# Patient Record
Sex: FEMALE | Race: WHITE | NOT HISPANIC OR LATINO | Employment: FULL TIME | URBAN - METROPOLITAN AREA
[De-identification: names, ages, dates, MRNs, and addresses within clinical notes are randomized per-mention and may not be internally consistent; named-entity substitution may affect disease eponyms.]

---

## 2017-02-10 ENCOUNTER — MYC MEDICAL ADVICE (OUTPATIENT)
Dept: FAMILY MEDICINE | Facility: CLINIC | Age: 37
End: 2017-02-10

## 2017-02-10 DIAGNOSIS — J01.90 ACUTE SINUSITIS, UNSPECIFIED: Primary | ICD-10-CM

## 2017-02-10 NOTE — TELEPHONE ENCOUNTER
RN Sinusitis Treatment Protocol: ages 18 and up  Evon Varela, a 36 year old female, is having symptoms reviewed for possible sinus infection.    ASSESSMENT/PLAN:  1.  Antibiotic treatment is indicated as onset of symptoms have been more than 10 days.  Amoxicillin-clavulanate 875mg/125mg orally twice daily or 500mg/125mg three times daily for 5 to 7 days.  2.  Education: ibuprofen or acetaminophen as directed on the bottle, over the counter decongestant, Nasal saline rinse, (Neti-pot or a nasal saline spray is preferred to Afrin nasal spray), Afrin nasal spray no longer than 3 days.  3.  Follow-up: Contact provider's triage RN if symptoms do not improve after 3 days of antibiotic treatment or if symptoms return after antibiotic therapy is complete.   4.  Patient verbalized understanding of this plan and is agreeable.    SUBJECTIVE:  Symptoms: Facial pain or pressure over the sinus areas, especially if worse with position change or cough, Nasal discharge/purulent, Tooth pain, Change in sense of smell or lack of smell and ear pressure, cough, fatigue     In addition notes: None   Shortness of breath: NO  Onset of symptoms was 15 day(s) ago.    Treatment measures tried include OTC meds.   Course of illness is worsening.  Predisposing conditions include: None    Complicating Factors and Serious Symptoms:   Patient reports: NONE   Patient denies: NONE    ALLERGIES:   Allergies   Allergen Reactions     Nkda [No Known Drug Allergies]        OBJECTIVE:    Weight: 0 lbs 0 oz        Encounter handled by: Nurse Triage.     Valeria Cortez RN

## 2017-09-13 DIAGNOSIS — Z30.40 ENCOUNTER FOR SURVEILLANCE OF CONTRACEPTIVES: ICD-10-CM

## 2017-09-15 RX ORDER — DESOGESTREL AND ETHINYL ESTRADIOL AND ETHINYL ESTRADIOL 21-5 (28)
KIT ORAL
Qty: 84 TABLET | Refills: 0 | Status: SHIPPED | OUTPATIENT
Start: 2017-09-15 | End: 2017-12-27

## 2017-09-15 NOTE — TELEPHONE ENCOUNTER
Patient is not due until 11/2017 for her physical.  Sent medication to get her to that appointment.  Marce House RN

## 2017-10-15 ENCOUNTER — OFFICE VISIT (OUTPATIENT)
Dept: URGENT CARE | Facility: URGENT CARE | Age: 37
End: 2017-10-15
Payer: OTHER GOVERNMENT

## 2017-10-15 ENCOUNTER — RADIANT APPOINTMENT (OUTPATIENT)
Dept: GENERAL RADIOLOGY | Facility: CLINIC | Age: 37
End: 2017-10-15
Attending: PHYSICIAN ASSISTANT
Payer: OTHER GOVERNMENT

## 2017-10-15 ENCOUNTER — TRANSFERRED RECORDS (OUTPATIENT)
Dept: HEALTH INFORMATION MANAGEMENT | Facility: CLINIC | Age: 37
End: 2017-10-15

## 2017-10-15 VITALS
HEART RATE: 69 BPM | DIASTOLIC BLOOD PRESSURE: 68 MMHG | TEMPERATURE: 98.2 F | BODY MASS INDEX: 33.41 KG/M2 | OXYGEN SATURATION: 99 % | SYSTOLIC BLOOD PRESSURE: 110 MMHG | WEIGHT: 207 LBS | RESPIRATION RATE: 16 BRPM

## 2017-10-15 DIAGNOSIS — R07.81 RIB PAIN ON LEFT SIDE: Primary | ICD-10-CM

## 2017-10-15 DIAGNOSIS — R10.12 LUQ ABDOMINAL PAIN: ICD-10-CM

## 2017-10-15 DIAGNOSIS — R07.81 RIB PAIN ON LEFT SIDE: ICD-10-CM

## 2017-10-15 LAB
BASOPHILS # BLD AUTO: 0 10E9/L (ref 0–0.2)
BASOPHILS NFR BLD AUTO: 0.3 %
DIFFERENTIAL METHOD BLD: NORMAL
EOSINOPHIL # BLD AUTO: 0.1 10E9/L (ref 0–0.7)
EOSINOPHIL NFR BLD AUTO: 1.4 %
ERYTHROCYTE [DISTWIDTH] IN BLOOD BY AUTOMATED COUNT: 12.8 % (ref 10–15)
HCT VFR BLD AUTO: 41.8 % (ref 35–47)
HGB BLD-MCNC: 13.7 G/DL (ref 11.7–15.7)
LYMPHOCYTES # BLD AUTO: 2.8 10E9/L (ref 0.8–5.3)
LYMPHOCYTES NFR BLD AUTO: 31.1 %
MCH RBC QN AUTO: 30.2 PG (ref 26.5–33)
MCHC RBC AUTO-ENTMCNC: 32.8 G/DL (ref 31.5–36.5)
MCV RBC AUTO: 92 FL (ref 78–100)
MONOCYTES # BLD AUTO: 0.6 10E9/L (ref 0–1.3)
MONOCYTES NFR BLD AUTO: 6.8 %
NEUTROPHILS # BLD AUTO: 5.5 10E9/L (ref 1.6–8.3)
NEUTROPHILS NFR BLD AUTO: 60.4 %
PLATELET # BLD AUTO: 340 10E9/L (ref 150–450)
RBC # BLD AUTO: 4.53 10E12/L (ref 3.8–5.2)
WBC # BLD AUTO: 9.1 10E9/L (ref 4–11)

## 2017-10-15 PROCEDURE — 36415 COLL VENOUS BLD VENIPUNCTURE: CPT | Performed by: PHYSICIAN ASSISTANT

## 2017-10-15 PROCEDURE — 85025 COMPLETE CBC W/AUTO DIFF WBC: CPT | Performed by: PHYSICIAN ASSISTANT

## 2017-10-15 PROCEDURE — 99214 OFFICE O/P EST MOD 30 MIN: CPT | Performed by: PHYSICIAN ASSISTANT

## 2017-10-15 PROCEDURE — 71101 X-RAY EXAM UNILAT RIBS/CHEST: CPT | Mod: LT

## 2017-10-15 RX ORDER — HYDROCODONE BITARTRATE AND ACETAMINOPHEN 5; 325 MG/1; MG/1
1 TABLET ORAL EVERY 6 HOURS PRN
Qty: 15 TABLET | Refills: 0 | Status: SHIPPED | OUTPATIENT
Start: 2017-10-15 | End: 2017-10-30

## 2017-10-15 RX ORDER — CYCLOBENZAPRINE HCL 10 MG
10 TABLET ORAL
Qty: 14 TABLET | Refills: 1 | Status: SHIPPED | OUTPATIENT
Start: 2017-10-15 | End: 2018-01-03

## 2017-10-15 ASSESSMENT — PAIN SCALES - GENERAL: PAINLEVEL: EXTREME PAIN (8)

## 2017-10-15 NOTE — PROGRESS NOTES
SUBJECTIVE:                                                    Evon Varela is a 37 year old female who presents to clinic today for the following health issues:      Patient fell 10/8/17 and landed on her left side, she c/o pain     On a chair in the kitchen cleaning her window. Chair slipped and shee landed onto her left ribs on the lip of the sink. Not so bad the first 2 days. Now with increased pain into LEFT UPPER QUADRANT. Using 4 Advil at a time. Hard to sleep. Hard to take a deep breath.      Allergies   Allergen Reactions     Nkda [No Known Drug Allergies]        Past Medical History:   Diagnosis Date     Seasonal allergies      URI (upper respiratory infection) 1/4/2012         Current Outpatient Prescriptions on File Prior to Visit:  VIORELE 0.15-0.02/0.01 MG (21/5) per tablet TAKE 1 TABLET BY MOUTH EVERY DAY   albuterol (PROAIR HFA, PROVENTIL HFA, VENTOLIN HFA) 108 (90 BASE) MCG/ACT inhaler Inhale 2 puffs into the lungs every 6 hours as needed for shortness of breath / dyspnea or wheezing   sertraline (ZOLOFT) 25 MG tablet Take 1 tablet (25 mg) by mouth daily   albuterol (PROAIR HFA, PROVENTIL HFA, VENTOLIN HFA) 108 (90 BASE) MCG/ACT inhaler Inhale 2 puffs into the lungs every 4 hours as needed for shortness of breath / dyspnea     No current facility-administered medications on file prior to visit.     Social History   Substance Use Topics     Smoking status: Never Smoker     Smokeless tobacco: Never Used     Alcohol use No       ROS:  Consitutional: As above  ENT: As above  Respiratory: As above    OBJECTIVE:  /68  Pulse 69  Temp 98.2  F (36.8  C) (Oral)  Resp 16  Wt 207 lb (93.9 kg)  SpO2 99%  Breastfeeding? No  BMI 33.41 kg/m2  GENERAL APPEARANCE: healthy, alert and no distress  EYES: conjunctiva clear  EARS: No cerumen.   Ear canals w/o erythema, TM's intact w/o erythema.    NOSE/MOUTH: Nose and mouth without ulcers, erythema or lesions  THROAT: Mild erythema w/o tonsillar  enlargement . No exudates  NECK: supple, nontender, no lymphadenopathy  RESP: lungs clear to auscultation - no rales, rhonchi or wheezes  CV: regular rates and rhythm, normal S1 S2, no murmur noted  NEURO: awake, alert    Abdomen- No HSM. Mild- to moderate LEFT UPPER QUADRANT tenderness to palpation.  Tender over left lateral inferior rib cage.    Results for orders placed or performed in visit on 10/15/17   CBC with platelets differential   Result Value Ref Range    WBC 9.1 4.0 - 11.0 10e9/L    RBC Count 4.53 3.8 - 5.2 10e12/L    Hemoglobin 13.7 11.7 - 15.7 g/dL    Hematocrit 41.8 35.0 - 47.0 %    MCV 92 78 - 100 fl    MCH 30.2 26.5 - 33.0 pg    MCHC 32.8 31.5 - 36.5 g/dL    RDW 12.8 10.0 - 15.0 %    Platelet Count 340 150 - 450 10e9/L    Diff Method Automated Method     % Neutrophils 60.4 %    % Lymphocytes 31.1 %    % Monocytes 6.8 %    % Eosinophils 1.4 %    % Basophils 0.3 %    Absolute Neutrophil 5.5 1.6 - 8.3 10e9/L    Absolute Lymphocytes 2.8 0.8 - 5.3 10e9/L    Absolute Monocytes 0.6 0.0 - 1.3 10e9/L    Absolute Eosinophils 0.1 0.0 - 0.7 10e9/L    Absolute Basophils 0.0 0.0 - 0.2 10e9/L         ASSESSMENT: Well appearing.    ICD-10-CM    1. Rib pain on left side R07.81 CBC with platelets differential     XR Ribs & Chest Left G/E 3 Views     US Abdomen Complete     HYDROcodone-acetaminophen (NORCO) 5-325 MG per tablet     cyclobenzaprine (FLEXERIL) 10 MG tablet   2. LUQ abdominal pain R10.12 US Abdomen Complete     HYDROcodone-acetaminophen (NORCO) 5-325 MG per tablet     cyclobenzaprine (FLEXERIL) 10 MG tablet         PLAN: I am a little concerned about injury to spleen. Will send for left abdominal US.  Lots of rest and fluids.  RTC if any worsening symptoms or if not improving.  Radiologist called. US showed normal spleen and kidney. Relayed to patient. F/U PCP prn.  Karina Byers PA-C

## 2017-10-15 NOTE — NURSING NOTE
"Chief Complaint   Patient presents with     Fall     c/o left side rib pain after 10/8/17       Initial /68  Pulse 69  Temp 98.2  F (36.8  C) (Oral)  Resp 16  Wt 207 lb (93.9 kg)  SpO2 99%  Breastfeeding? No  BMI 33.41 kg/m2 Estimated body mass index is 33.41 kg/(m^2) as calculated from the following:    Height as of 11/18/16: 5' 6\" (1.676 m).    Weight as of this encounter: 207 lb (93.9 kg).  Medication Reconciliation: complete   Robert Romano MA      "

## 2017-10-15 NOTE — MR AVS SNAPSHOT
After Visit Summary   10/15/2017    Evon Varela    MRN: [id number]           Patient Information     Date Of Birth          1980        Visit Information        Provider Department      10/15/2017 9:20 AM Karina Byers PA-C [facility]        Today's Diagnoses     Rib pain on left side    -  1    LUQ abdominal pain           Follow-ups after your visit        Future tests that were ordered for you today     Open Future Orders        Priority Expected Expires Ordered    US Abdomen Complete Routine  10/15/2018 10/15/2017            Who to contact     If you have questions or need follow up information about today's clinic visit or your schedule please contact [facility] directly at [phone].  Normal or non-critical lab and imaging results will be communicated to you by [employer]cas[employer], letter or phone within 4 business days after the clinic has received the results. If you do not hear from us within 7 days, please contact the clinic through Adcasthart or phone. If you have a critical or abnormal lab result, we will notify you by phone as soon as possible.  Submit refill requests through [employer] or call your pharmacy and they will forward the refill request to us. Please allow 3 business days for your refill to be completed.          Additional Information About Your Visit        MyChart Information     [employer] gives you secure access to your electronic health record. If you see a primary care provider, you can also send messages to your care team and make appointments. If you have questions, please call your primary care clinic.  If you do not have a primary care provider, please call [phone] and they will assist you.        Care EveryWhere ID     This is your Care EveryWhere ID. This could be used by other organizations to access your [facility] medical records  [id number]        Your Vitals Were     Pulse Temperature Respirations Pulse Oximetry Breastfeeding?  BMI (Body Mass Index)    69 98.2  F (36.8  C) (Oral) 16 99% No 33.41 kg/m2       Blood Pressure from Last 3 Encounters:   10/15/17 110/68   11/18/16 123/75   12/30/15 128/82    Weight from Last 3 Encounters:   10/15/17 207 lb (93.9 kg)   11/28/16 201 lb 9.6 oz (91.4 kg)   11/18/16 202 lb 9.6 oz (91.9 kg)              We Performed the Following     CBC with platelets differential          Today's Medication Changes          These changes are accurate as of: 10/15/17 10:43 AM.  If you have any questions, ask your nurse or doctor.               Start taking these medicines.        Dose/Directions    cyclobenzaprine 10 MG tablet   Commonly known as:  FLEXERIL   Used for:  LUQ abdominal pain, Rib pain on left side   Started by:  Karina Byers PA-C        Dose:  10 mg   Take 1 tablet (10 mg) by mouth nightly as needed for muscle spasms   Quantity:  14 tablet   Refills:  1       HYDROcodone-acetaminophen 5-325 MG per tablet   Commonly known as:  NORCO   Used for:  Rib pain on left side, LUQ abdominal pain   Started by:  Karina Byers PA-C        Dose:  1 tablet   Take 1 tablet by mouth every 6 hours as needed for moderate to severe pain   Quantity:  15 tablet   Refills:  0            Where to get your medicines      These medications were sent to [facility] [id number] [place] [address] [address] [place] [address] AT [facility] [place]  [address] [facility] [address]     Phone:  [phone]     cyclobenzaprine 10 MG tablet         Some of these will need a paper prescription and others can be bought over the counter.  Ask your nurse if you have questions.     Bring a paper prescription for each of these medications     HYDROcodone-acetaminophen 5-325 MG per tablet                Primary Care Provider Office Phone # Fax #    Aldo Peralta [phone] [address] [facility] [address]        Equal Access to Services     DENI OSEGUERA AH: Hadii tawnya vneegas  jan West, wastefanieda luqadaha, qaybta kaalmada rakan, annie menendez madhavlily carmelaanyi laluciajuan cristina. So [facility] [phone].    ATENCIÓN: Si habla trina, tiene a frazier disposición servicios gratuitos de asistencia lingüística. Luisana al [phone].    We comply with applicable federal civil rights laws and [place] laws. We do not discriminate on the basis of race, color, national origin, age, disability, sex, sexual orientation, or gender identity.            Thank you!     Thank you for [facility]  [facility]. Our goal is always to provide you with excellent care. Hearing back from our patients is one way we can continue to improve our services. Please take a few minutes to complete the written survey that you may receive in the mail after your visit with us. Thank you!             Your Updated Medication List - Protect others around you: Learn how to safely use, store and throw away your medicines at www.disposemymeds.org.          This list is accurate as of: 10/15/17 10:43 AM.  Always use your most recent med list.                   Brand Name Dispense Instructions for use Diagnosis    * albuterol 108 (90 BASE) MCG/ACT Inhaler    PROAIR HFA/PROVENTIL HFA/VENTOLIN HFA    1 Inhaler    Inhale 2 puffs into the lungs every 4 hours as needed for shortness of breath / dyspnea    Seasonal allergies       * albuterol 108 (90 BASE) MCG/ACT Inhaler    PROAIR HFA/PROVENTIL HFA/VENTOLIN HFA    1 Inhaler    Inhale 2 puffs into the lungs every 6 hours as needed for shortness of breath / dyspnea or wheezing    Acute maxillary sinusitis, recurrence not specified, Wheezing       cyclobenzaprine 10 MG tablet    FLEXERIL    14 tablet    Take 1 tablet (10 mg) by mouth nightly as needed for muscle spasms    LUQ abdominal pain, Rib pain on left side       HYDROcodone-acetaminophen 5-325 MG per tablet    NORCO    15 tablet    Take 1 tablet by mouth every 6 hours as needed for moderate to severe pain    Rib  pain on left side, LUQ abdominal pain       sertraline 25 MG tablet    ZOLOFT    90 tablet    Take 1 tablet (25 mg) by mouth daily    Generalized anxiety disorder       VIORELE 0.15-0.02/0.01 MG (21/5) per tablet   Generic drug:  desogestrel-ethinyl estradiol     84 tablet    TAKE 1 TABLET BY MOUTH EVERY DAY    Encounter for surveillance of contraceptives       * Notice:  This list has 2 medication(s) that are the same as other medications prescribed for you. Read the directions carefully, and ask your doctor or other care provider to review them with you.

## 2017-11-26 ENCOUNTER — HEALTH MAINTENANCE LETTER (OUTPATIENT)
Age: 37
End: 2017-11-26

## 2017-12-15 DIAGNOSIS — F41.1 GENERALIZED ANXIETY DISORDER: ICD-10-CM

## 2017-12-19 ENCOUNTER — TELEPHONE (OUTPATIENT)
Dept: OBGYN | Facility: CLINIC | Age: 37
End: 2017-12-19

## 2017-12-19 RX ORDER — SERTRALINE HYDROCHLORIDE 25 MG/1
25 TABLET, FILM COATED ORAL DAILY
Qty: 30 TABLET | Refills: 0 | Status: SHIPPED | OUTPATIENT
Start: 2017-12-19 | End: 2018-01-02

## 2017-12-19 NOTE — TELEPHONE ENCOUNTER
Patient returned call.  Scheduled AFE with Adele on 1/3/18.  Requesting extension refill to get her through to this appointment.

## 2017-12-19 NOTE — TELEPHONE ENCOUNTER
Last prescribed by Adele Troy on 11/`18/16 at AFE.  Patient overdue for AFE.  Left voicemail for patient to call.

## 2017-12-19 NOTE — TELEPHONE ENCOUNTER
Patient calling is running low on her birth control, has scheduled an appt for alexandra on 1/3. Would like a script called in to get her by until seen. Please call to advise.

## 2017-12-27 DIAGNOSIS — Z30.41 ENCOUNTER FOR SURVEILLANCE OF CONTRACEPTIVE PILLS: Primary | ICD-10-CM

## 2017-12-27 RX ORDER — DESOGESTREL AND ETHINYL ESTRADIOL AND ETHINYL ESTRADIOL 21-5 (28)
KIT ORAL
Qty: 84 TABLET | Refills: 0 | Status: SHIPPED | OUTPATIENT
Start: 2017-12-27 | End: 2018-01-02

## 2017-12-27 NOTE — TELEPHONE ENCOUNTER
Per last refill on 9/15/17- patient is due for AFE in November 17.  Patient has upcoming appointment scheduled with Adele on 1/3/18.  Patient requesting extension refill to get her through to appointment.

## 2017-12-27 NOTE — LETTER
[facility] [place] [facility]  [address] [facility] [address]  Phone: [phone]    12/27/17    Evon Varela  [address] [facility] [address]      Dear Evon-    Regarding a recent refill request we received- one refill was sent to the pharmacy.  Please keep your upcoming scheduled appointment.     Sincerely,      JANNA Sibley CNP

## 2018-01-03 ENCOUNTER — OFFICE VISIT (OUTPATIENT)
Dept: OBGYN | Facility: CLINIC | Age: 38
End: 2018-01-03
Payer: OTHER GOVERNMENT

## 2018-01-03 VITALS
TEMPERATURE: 96.7 F | WEIGHT: 209.2 LBS | BODY MASS INDEX: 33.62 KG/M2 | DIASTOLIC BLOOD PRESSURE: 83 MMHG | HEIGHT: 66 IN | HEART RATE: 80 BPM | SYSTOLIC BLOOD PRESSURE: 134 MMHG

## 2018-01-03 DIAGNOSIS — Z01.419 ENCOUNTER FOR GYNECOLOGICAL EXAMINATION WITHOUT ABNORMAL FINDING: Primary | ICD-10-CM

## 2018-01-03 DIAGNOSIS — Z13.6 CARDIOVASCULAR SCREENING; LDL GOAL LESS THAN 160: ICD-10-CM

## 2018-01-03 DIAGNOSIS — F41.1 GENERALIZED ANXIETY DISORDER: ICD-10-CM

## 2018-01-03 DIAGNOSIS — Z30.41 ENCOUNTER FOR SURVEILLANCE OF CONTRACEPTIVE PILLS: ICD-10-CM

## 2018-01-03 PROCEDURE — G0145 SCR C/V CYTO,THINLAYER,RESCR: HCPCS | Performed by: NURSE PRACTITIONER

## 2018-01-03 PROCEDURE — 99395 PREV VISIT EST AGE 18-39: CPT | Performed by: NURSE PRACTITIONER

## 2018-01-03 PROCEDURE — 87624 HPV HI-RISK TYP POOLED RSLT: CPT | Performed by: NURSE PRACTITIONER

## 2018-01-03 RX ORDER — DESOGESTREL AND ETHINYL ESTRADIOL 21-5 (28)
1 KIT ORAL DAILY
Qty: 84 TABLET | Refills: 3 | Status: SHIPPED | OUTPATIENT
Start: 2018-01-03 | End: 2019-03-05

## 2018-01-03 RX ORDER — SERTRALINE HYDROCHLORIDE 25 MG/1
25 TABLET, FILM COATED ORAL DAILY
Qty: 90 TABLET | Refills: 1 | Status: SHIPPED | OUTPATIENT
Start: 2018-01-03 | End: 2018-09-07

## 2018-01-03 ASSESSMENT — ANXIETY QUESTIONNAIRES
GAD7 TOTAL SCORE: 2
2. NOT BEING ABLE TO STOP OR CONTROL WORRYING: NOT AT ALL
6. BECOMING EASILY ANNOYED OR IRRITABLE: SEVERAL DAYS
5. BEING SO RESTLESS THAT IT IS HARD TO SIT STILL: NOT AT ALL
1. FEELING NERVOUS, ANXIOUS, OR ON EDGE: SEVERAL DAYS
7. FEELING AFRAID AS IF SOMETHING AWFUL MIGHT HAPPEN: NOT AT ALL
3. WORRYING TOO MUCH ABOUT DIFFERENT THINGS: NOT AT ALL
IF YOU CHECKED OFF ANY PROBLEMS ON THIS QUESTIONNAIRE, HOW DIFFICULT HAVE THESE PROBLEMS MADE IT FOR YOU TO DO YOUR WORK, TAKE CARE OF THINGS AT HOME, OR GET ALONG WITH OTHER PEOPLE: NOT DIFFICULT AT ALL

## 2018-01-03 ASSESSMENT — PAIN SCALES - GENERAL: PAINLEVEL: NO PAIN (0)

## 2018-01-03 ASSESSMENT — PATIENT HEALTH QUESTIONNAIRE - PHQ9
5. POOR APPETITE OR OVEREATING: NOT AT ALL
SUM OF ALL RESPONSES TO PHQ QUESTIONS 1-9: 0

## 2018-01-03 NOTE — LETTER
[facility]  [address] [facility] [address] [phone]        January 8, 2019    Evon Varela  [address] [facility] [address]              Dear Evon Varela    This is to remind you that your Fasting lab is due.    You may call our office at [phone] to schedule an appointment.    Please disregard this notice if you have already had your labs drawn or made an appointment.        Sincerely,        Adele Troy NP

## 2018-01-03 NOTE — NURSING NOTE
"Chief Complaint   Patient presents with     Physical       Initial /83  Pulse 80  Temp 96.7  F (35.9  C) (Oral)  Ht 5' 5.75\" (1.67 m)  Wt 209 lb 3.2 oz (94.9 kg)  LMP 12/27/2017 (Exact Date)  BMI 34.02 kg/m2 Estimated body mass index is 34.02 kg/(m^2) as calculated from the following:    Height as of this encounter: 5' 5.75\" (1.67 m).    Weight as of this encounter: 209 lb 3.2 oz (94.9 kg)..  BP completed using cuff size: regular on forearm      Nicol Mazariegos CMA    "

## 2018-01-03 NOTE — LETTER
January 6, 2018    Evon Varela  [address] [facility] [address]    Dear Evon,  We are happy to inform you that your PAP smear result from 1/3/18 is normal.  We are now able to do a follow up test on PAP smears. The DNA test is for HPV (Human Papilloma Virus). Cervical cancer is closely linked with certain types of HPV. Your result showed no evidence of high risk HPV.  Therefore we recommend you return in 5 years for your next pap smear and HPV test.  You will still need to return to the clinic every year for an annual exam and other preventive tests.  Please contact the clinic at [phone] with any questions.  Sincerely,    JANNA Sibley CNP/rlm

## 2018-01-03 NOTE — MR AVS SNAPSHOT
After Visit Summary   1/3/2018    Evon Varela    MRN: [id number]           Patient Information     Date Of Birth          1980        Visit Information        Provider Department      1/3/2018 9:10 AM Adele Troy APRN CNP [facility]        Today's Diagnoses     Encounter for gynecological examination without abnormal finding    -  1    Encounter for surveillance of contraceptive pills        Generalized anxiety disorder        CARDIOVASCULAR SCREENING; LDL GOAL LESS THAN 160           Follow-ups after your visit        Future tests that were ordered for you today     Open Future Orders        Priority Expected Expires Ordered    Lipid panel reflex to direct LDL Fasting Routine  1/3/2019 1/3/2018            Who to contact     If you have questions or need follow up information about today's clinic visit or your schedule please contact [facility] directly at [phone].  Normal or non-critical lab and imaging results will be communicated to you by MyChart, letter or phone within 4 business days after the clinic has received the results. If you do not hear from us within 7 days, please contact the clinic through MyChart or phone. If you have a critical or abnormal lab result, we will notify you by phone as soon as possible.  Submit refill requests through [employer] or call your pharmacy and they will forward the refill request to us. Please allow 3 business days for your refill to be completed.          Additional Information About Your Visit        MyChart Information     [employer] gives you secure access to your electronic health record. If you see a primary care provider, you can also send messages to your care team and make appointments. If you have questions, please call your primary care clinic.  If you do not have a primary care provider, please call [phone] and they will assist you.        Care EveryWhere ID     This is your Care EveryWhere ID.  "This could be used by other organizations to access your [facility] medical records  [id number]        Your Vitals Were     Pulse Temperature Height Last Period BMI (Body Mass Index)       80 96.7  F (35.9  C) (Oral) 5' 5.75\" (1.67 m) 12/27/2017 (Exact Date) 34.02 kg/m2        Blood Pressure from Last 3 Encounters:   01/03/18 134/83   10/15/17 110/68   11/18/16 123/75    Weight from Last 3 Encounters:   01/03/18 209 lb 3.2 oz (94.9 kg)   10/15/17 207 lb (93.9 kg)   11/28/16 201 lb 9.6 oz (91.4 kg)              We Performed the Following     HPV High Risk Types DNA Cervical     Pap imaged thin layer screen with HPV - recommended age 30 - 65 years (select HPV order below)          Today's Medication Changes          These changes are accurate as of: 1/3/18  9:45 AM.  If you have any questions, ask your nurse or doctor.               These medicines have changed or have updated prescriptions.        Dose/Directions    albuterol 108 (90 BASE) MCG/ACT Inhaler   Commonly known as:  PROAIR HFA/PROVENTIL HFA/VENTOLIN HFA   This may have changed:  Another medication with the same name was removed. Continue taking this medication, and follow the directions you see here.   Used for:  Seasonal allergies   Changed by:  Aldo Peralta MD        Dose:  2 puff   Inhale 2 puffs into the lungs every 4 hours as needed for shortness of breath / dyspnea   Quantity:  1 Inhaler   Refills:  1       desogestrel-ethinyl estradiol 0.15-0.02/0.01 MG (21/5) per tablet   Commonly known as:  VIORELE   This may have changed:  See the new instructions.   Used for:  Encounter for surveillance of contraceptive pills   Changed by:  Adele Troy APRN CNP        Dose:  1 tablet   Take 1 tablet by mouth daily   Quantity:  84 tablet   Refills:  3       sertraline 25 MG tablet   Commonly known as:  ZOLOFT   This may have changed:  additional instructions   Used for:  Generalized anxiety disorder   Changed by:  Adele Troy APRN " CNP        Dose:  25 mg   Take 1 tablet (25 mg) by mouth daily   Quantity:  90 tablet   Refills:  1         Stop taking these medicines if you haven't already. Please contact your care team if you have questions.     cyclobenzaprine 10 MG tablet   Commonly known as:  FLEXERIL   Stopped by:  Adele Troy APRN CNP                Where to get your medicines      These medications were sent to [facility] [address] [place] [address] [address] [place] [address] AT [facility] [place]  [address] [facility] [address]     Phone:  [phone]     desogestrel-ethinyl estradiol 0.15-0.02/0.01 MG (21/5) per tablet    sertraline 25 MG tablet                Primary Care Provider Office Phone # Fax #    Aldo Peralta [phone] [address] [facility] [address]        Equal Access to Services     JOSEPHINE [facility] Hospital[facility] AH: Hadii tawnya ku hadasho Soomaali, waaxda luqadaha, qaybta kaalmada adeegyada, waxay yanethin hayjessica lentz . So [facility] [phone].    ATENCIÓN: Si sg espliz, tiene a frazier disposición servicios gratuitos de asistencia lingüística. Llame al [phone].    We comply with applicable federal civil rights laws and [place] laws. We do not discriminate on the basis of race, color, national origin, age, disability, sex, sexual orientation, or gender identity.            Thank you!     Thank you for choosing [facility]  for your care. Our goal is always to provide you with excellent care. Hearing back from our patients is one way we can continue to improve our services. Please take a few minutes to complete the written survey that you may receive in the mail after your visit with us. Thank you!             Your Updated Medication List - Protect others around you: Learn how to safely use, store and throw away your medicines at www.disposemymeds.org.          This list is accurate as of: 1/3/18  9:45 AM.  Always use your most recent med  list.                   Brand Name Dispense Instructions for use Diagnosis    albuterol 108 (90 BASE) MCG/ACT Inhaler    PROAIR HFA/PROVENTIL HFA/VENTOLIN HFA    1 Inhaler    Inhale 2 puffs into the lungs every 4 hours as needed for shortness of breath / dyspnea    Seasonal allergies       desogestrel-ethinyl estradiol 0.15-0.02/0.01 MG (21/5) per tablet    VIORELE    84 tablet    Take 1 tablet by mouth daily    Encounter for surveillance of contraceptive pills       sertraline 25 MG tablet    ZOLOFT    90 tablet    Take 1 tablet (25 mg) by mouth daily    Generalized anxiety disorder

## 2018-01-03 NOTE — PROGRESS NOTES
SUBJECTIVE:   CC: Evon Varela is an 37 year old woman who presents for preventive health visit.     Physical   Annual:     Getting at least 3 servings of Calcium per day::  Yes    Bi-annual eye exam::  Yes    Dental care twice a year::  Yes    Sleep apnea or symptoms of sleep apnea::  None    Diet::  Regular (no restrictions)    Frequency of exercise::  None    Taking medications regularly::  Yes    Medication side effects::  Not applicable    Additional concerns today::  YES                Wants to discuss stopping Zoloft.       Today's PHQ-2 Score:   PHQ-2 ( 1999 Pfizer) 1/3/2018   Q1: Little interest or pleasure in doing things 0   Q2: Feeling down, depressed or hopeless 0   PHQ-2 Score 0   Q1: Little interest or pleasure in doing things Not at all   Q2: Feeling down, depressed or hopeless Not at all   PHQ-2 Score 0       Abuse: Current or Past(Physical, Sexual or Emotional)- No  Do you feel safe in your environment - Yes    Social History   Substance Use Topics     Smoking status: Never Smoker     Smokeless tobacco: Never Used     Alcohol use No     Alcohol Use 1/3/2018   If you drink alcohol, do you typically have greater than 3 drinks per day OR greater than 7 drinks per week?   No       Reviewed orders with patient.  Reviewed health maintenance and updated orders accordingly - Yes  Patient Active Problem List   Diagnosis     Generalized anxiety disorder     CARDIOVASCULAR SCREENING; LDL GOAL LESS THAN 160     Encounter for supervision of other normal pregnancy     Right ankle pain     Encounter for initial prescription of contraceptive pills     History of thyroid disease     Past Surgical History:   Procedure Laterality Date     ENT SURGERY      deviated septum     ENT SURGERY         Social History   Substance Use Topics     Smoking status: Never Smoker     Smokeless tobacco: Never Used     Alcohol use No     Family History   Problem Relation Age of Onset     Arthritis Mother      osteoarthitis      "HEART DISEASE Father      Arthritis Father      Arthritis Maternal Grandmother      Thyroid Disease Mother              Mammogram not appropriate for this patient based on age.    Pertinent mammograms are reviewed under the imaging tab.  History of abnormal Pap smear:   NO - age 30-65 PAP every 5 years with negative HPV co-testing recommended  Last 3 Pap and HPV Results:   PAP / HPV 9/25/2014 9/23/2011   PAP NIL NIL       Reviewed and updated as needed this visit by clinical staffTobacco  Allergies  Meds  Med Hx  Surg Hx  Fam Hx  Soc Hx        Reviewed and updated as needed this visit by Provider        Past Medical History:   Diagnosis Date     Seasonal allergies      URI (upper respiratory infection) 1/4/2012      Past Surgical History:   Procedure Laterality Date     ENT SURGERY      deviated septum     ENT SURGERY         Review of Systems  C: NEGATIVE for fever, chills, change in weight  I: NEGATIVE for worrisome rashes, moles or lesions  E: NEGATIVE for vision changes or irritation  ENT: NEGATIVE for ear, mouth and throat problems  R: NEGATIVE for significant cough or SOB  B: NEGATIVE for masses, tenderness or discharge  CV: NEGATIVE for chest pain, palpitations or peripheral edema  GI: NEGATIVE for nausea, abdominal pain, heartburn, or change in bowel habits  : NEGATIVE for unusual urinary or vaginal symptoms. Periods are regular.  M: NEGATIVE for significant arthralgias or myalgia  N: NEGATIVE for weakness, dizziness or paresthesias  P: NEGATIVE for changes in mood or affect. Anxiety well controlled-unsure if she wants to continue Zoloft. Anxiety mostly related to obsessing about tasks. If she feels she needs to do something, fixates on it until it has been completed. No adverse side effects with the medication.     OBJECTIVE:   /83  Pulse 80  Temp 96.7  F (35.9  C) (Oral)  Ht 5' 5.75\" (1.67 m)  Wt 209 lb 3.2 oz (94.9 kg)  LMP 12/27/2017 (Exact Date)  BMI 34.02 kg/m2  Physical " Exam  GENERAL: healthy, alert and no distress  EYES: Eyes grossly normal to inspection, PERRL and conjunctivae and sclerae normal  HENT: ear canals and TM's normal, nose and mouth without ulcers or lesions  NECK: no adenopathy, no asymmetry, masses, or scars and thyroid normal to palpation  RESP: lungs clear to auscultation - no rales, rhonchi or wheezes  BREAST: normal without masses, tenderness or nipple discharge and no palpable axillary masses or adenopathy  CV: regular rate and rhythm, normal S1 S2, no S3 or S4, no murmur, click or rub, no peripheral edema and peripheral pulses strong  ABDOMEN: soft, nontender, no hepatosplenomegaly, no masses and bowel sounds normal   (female): normal female external genitalia, normal urethral meatus, vaginal mucosa pink, moist, well rugated, and normal cervix/adnexa/uterus without masses or discharge  MS: no gross musculoskeletal defects noted, no edema  SKIN: no suspicious lesions or rashes  NEURO: Normal strength and tone, mentation intact and speech normal  PSYCH: mentation appears normal, affect normal/bright    ASSESSMENT/PLAN:   1. Encounter for gynecological examination without abnormal finding  - Pap imaged thin layer screen with HPV - recommended age 30 - 65 years (select HPV order below)  - HPV High Risk Types DNA Cervical    2. Encounter for surveillance of contraceptive pills  - desogestrel-ethinyl estradiol (VIORELE) 0.15-0.02/0.01 MG (21/5) per tablet; Take 1 tablet by mouth daily  Dispense: 84 tablet; Refill: 3    3. Generalized anxiety disorder  Reviewed her GARY and PHQ-9. Discussed continuing vs discontinuing medication, patient is on low dose. For now, wants to continue, but we did discuss how to wean in the event she chooses to.  - sertraline (ZOLOFT) 25 MG tablet; Take 1 tablet (25 mg) by mouth daily  Dispense: 90 tablet; Refill: 1    4. CARDIOVASCULAR SCREENING; LDL GOAL LESS THAN 160  Return to clinic fasting  - Lipid panel reflex to direct LDL  Fasting; Future    COUNSELING:  Reviewed preventive health counseling, as reflected in patient instructions  Special attention given to:        Regular exercise       Healthy diet/nutrition       Contraception    Counseling Resources:  ATP IV Guidelines  Pooled Cohorts Equation Calculator  Breast Cancer Risk Calculator  FRAX Risk Assessment  ICSI Preventive Guidelines  Dietary Guidelines for Americans, 2010  USDA's MyPlate  ASA Prophylaxis  Lung CA Screening    JANNA Sibley CNP  [facility]

## 2018-01-04 LAB
COPATH REPORT: NORMAL
PAP: NORMAL

## 2018-01-04 ASSESSMENT — ANXIETY QUESTIONNAIRES: GAD7 TOTAL SCORE: 2

## 2018-01-05 LAB
FINAL DIAGNOSIS: NORMAL
HPV HR 12 DNA CVX QL NAA+PROBE: NEGATIVE
HPV16 DNA SPEC QL NAA+PROBE: NEGATIVE
HPV18 DNA SPEC QL NAA+PROBE: NEGATIVE
SPECIMEN DESCRIPTION: NORMAL

## 2018-03-07 ENCOUNTER — E-VISIT (OUTPATIENT)
Dept: FAMILY MEDICINE | Facility: CLINIC | Age: 38
End: 2018-03-07
Payer: OTHER GOVERNMENT

## 2018-03-07 ENCOUNTER — MYC MEDICAL ADVICE (OUTPATIENT)
Dept: FAMILY MEDICINE | Facility: CLINIC | Age: 38
End: 2018-03-07

## 2018-03-07 DIAGNOSIS — J01.90 ACUTE NON-RECURRENT SINUSITIS, UNSPECIFIED LOCATION: Primary | ICD-10-CM

## 2018-03-07 PROCEDURE — 99444 ZZC PHYSICIAN ONLINE EVALUATION & MANAGEMENT SERVICE: CPT | Performed by: FAMILY MEDICINE

## 2018-03-07 RX ORDER — AMOXICILLIN 875 MG
875 TABLET ORAL 2 TIMES DAILY
Qty: 20 TABLET | Refills: 0 | Status: SHIPPED | OUTPATIENT
Start: 2018-03-07 | End: 2019-03-05

## 2018-03-07 NOTE — TELEPHONE ENCOUNTER
MyChart message sent to patient suggesting MyChart E-visit.   See message for details.    Ryanne San RN

## 2018-03-21 DIAGNOSIS — Z30.41 ENCOUNTER FOR SURVEILLANCE OF CONTRACEPTIVE PILLS: ICD-10-CM

## 2018-03-21 RX ORDER — DESOGESTREL AND ETHINYL ESTRADIOL AND ETHINYL ESTRADIOL 21-5 (28)
KIT ORAL
Qty: 84 TABLET | Refills: 0 | OUTPATIENT
Start: 2018-03-21

## 2018-03-21 NOTE — TELEPHONE ENCOUNTER
Phone call to pharmacy at [facility] and spoke to pharmacist. Verified Rx was received at pharmacy and refills are available. This is a duplicate order for the BC pills.  Lorena Romero RN

## 2018-05-18 ENCOUNTER — MYC REFILL (OUTPATIENT)
Dept: FAMILY MEDICINE | Facility: CLINIC | Age: 38
End: 2018-05-18

## 2018-05-18 DIAGNOSIS — J30.2 SEASONAL ALLERGIES: ICD-10-CM

## 2018-05-18 RX ORDER — ALBUTEROL SULFATE 90 UG/1
2 AEROSOL, METERED RESPIRATORY (INHALATION) EVERY 4 HOURS PRN
Qty: 1 INHALER | Refills: 1 | Status: CANCELLED | OUTPATIENT
Start: 2018-05-18

## 2018-05-18 NOTE — TELEPHONE ENCOUNTER
I have discussed this patient's issue with the RN involved and we have come up with this plan.  Destin Keene MD

## 2018-05-18 NOTE — TELEPHONE ENCOUNTER
Message from MyChart:  Original authorizing provider: Aldo Peralta MD    Evon Varela would like a refill of the following medications:  albuterol (PROAIR HFA, PROVENTIL HFA, VENTOLIN HFA) 108 (90 BASE) MCG/ACT inhaler [Aldo Peralta MD]    Preferred pharmacy: [facility] [id number] [place] [address] [place] [facility] [address]    Comment:  the one i have is

## 2018-05-18 NOTE — TELEPHONE ENCOUNTER
Per protocol, will route encounter to be cosigned by provider for Verbal Orders.  Jennyfer Concepcion RN

## 2018-05-22 DIAGNOSIS — J30.1 CHRONIC SEASONAL ALLERGIC RHINITIS DUE TO POLLEN: Primary | ICD-10-CM

## 2018-05-22 PROBLEM — J30.2 SEASONAL ALLERGIES: Status: ACTIVE | Noted: 2018-05-22

## 2018-05-22 RX ORDER — ALBUTEROL SULFATE 90 UG/1
2 AEROSOL, METERED RESPIRATORY (INHALATION) EVERY 4 HOURS PRN
Qty: 1 INHALER | Refills: 1 | Status: SHIPPED | OUTPATIENT
Start: 2018-05-22 | End: 2020-07-13

## 2018-09-07 DIAGNOSIS — F41.1 GENERALIZED ANXIETY DISORDER: ICD-10-CM

## 2018-09-07 RX ORDER — SERTRALINE HYDROCHLORIDE 25 MG/1
TABLET, FILM COATED ORAL
Qty: 90 TABLET | Refills: 0 | Status: SHIPPED | OUTPATIENT
Start: 2018-09-07 | End: 2018-12-28

## 2018-09-07 NOTE — TELEPHONE ENCOUNTER
"Requested Prescriptions   Pending Prescriptions Disp Refills     sertraline (ZOLOFT) 25 MG tablet [Pharmacy Med Name: SERTRALINE 25MG TABLETS] 90 tablet 0     Sig: TAKE 1 TABLET(25 MG) BY MOUTH DAILY    SSRIs Protocol Passed    9/7/2018 11:44 AM       Passed - Recent (12 mo) or future (30 days) visit within the authorizing provider's specialty    Patient had office visit in the last 12 months or has a visit in the next 30 days with authorizing provider or within the authorizing provider's specialty.  See \"Patient Info\" tab in inbasket, or \"Choose Columns\" in Meds & Orders section of the refill encounter.           Passed - Patient is age 18 or older       Passed - No active pregnancy on record       Passed - No positive pregnancy test in last 12 months        Prescription approved per [facility] Refill Protocol.    Delores Zhang RN    "

## 2018-09-30 ENCOUNTER — MYC REFILL (OUTPATIENT)
Dept: OBGYN | Facility: CLINIC | Age: 38
End: 2018-09-30

## 2018-09-30 DIAGNOSIS — F41.1 GENERALIZED ANXIETY DISORDER: ICD-10-CM

## 2018-09-30 DIAGNOSIS — Z30.41 ENCOUNTER FOR SURVEILLANCE OF CONTRACEPTIVE PILLS: ICD-10-CM

## 2018-10-03 RX ORDER — SERTRALINE HYDROCHLORIDE 25 MG/1
TABLET, FILM COATED ORAL
Qty: 90 TABLET | Refills: 0 | OUTPATIENT
Start: 2018-10-03

## 2018-10-03 RX ORDER — DESOGESTREL AND ETHINYL ESTRADIOL 21-5 (28)
1 KIT ORAL DAILY
Qty: 84 TABLET | Refills: 3 | OUTPATIENT
Start: 2018-10-03

## 2018-10-03 NOTE — TELEPHONE ENCOUNTER
Message from Shlomot:  Original authorizing provider: JANNA Sibley CNP    Evon Varela would like a refill of the following medications:  desogestrel-ethinyl estradiol (VIORELE) 0.15-0.02/0.01 MG (21/5) per tablet [JANNA Sibley CNP]  sertraline (ZOLOFT) 25 MG tablet [JANNA Sibley CNP]    Preferred pharmacy: [facility] [address] [place], [place] [address] [place] [facility] AT [address]    Comment:

## 2018-10-03 NOTE — TELEPHONE ENCOUNTER
Zoloft:   Refill not appropriate.  Rx sent to the requesting pharmacy on 9/7/18 for a 3 month supply with an additional 0 refills.    Viorele:  Refill not appropriate.  Rx sent to the requesting pharmacy on 1/3/18 for a 3 month supply with an additional 3 refills.    Delores Zhang RN

## 2018-10-25 ENCOUNTER — ALLIED HEALTH/NURSE VISIT (OUTPATIENT)
Dept: NURSING | Facility: CLINIC | Age: 38
End: 2018-10-25
Payer: OTHER GOVERNMENT

## 2018-10-25 DIAGNOSIS — Z23 NEED FOR PROPHYLACTIC VACCINATION AND INOCULATION AGAINST INFLUENZA: Primary | ICD-10-CM

## 2018-10-25 PROCEDURE — 90686 IIV4 VACC NO PRSV 0.5 ML IM: CPT

## 2018-10-25 PROCEDURE — 90471 IMMUNIZATION ADMIN: CPT

## 2018-10-25 PROCEDURE — 99207 ZZC NO CHARGE NURSE ONLY: CPT

## 2018-10-25 NOTE — MR AVS SNAPSHOT
After Visit Summary   10/25/2018    Evon Varela    MRN: [id number]           Patient Information     Date Of Birth          1980        Visit Information        Provider Department      10/25/2018 4:30 PM [facility] FLU CLINIC [facility]        Today's Diagnoses     Need for prophylactic vaccination and inoculation against influenza    -  1       Follow-ups after your visit        Who to contact     If you have questions or need follow up information about today's clinic visit or your schedule please contact [facility] directly at [phone].  Normal or non-critical lab and imaging results will be communicated to you by [employer], letter or phone within 4 business days after the clinic has received the results. If you do not hear from us within 7 days, please contact the clinic through [employer] or phone. If you have a critical or abnormal lab result, we will notify you by phone as soon as possible.  Submit refill requests through [employer] or call your pharmacy and they will forward the refill request to us. Please allow 3 business days for your refill to be completed.          Additional Information About Your Visit        MyChart Information     [employer] gives you secure access to your electronic health record. If you see a primary care provider, you can also send messages to your care team and make appointments. If you have questions, please call your primary care clinic.  If you do not have a primary care provider, please call [phone] and they will assist you.        Care EveryWhere ID     This is your Care EveryWhere ID. This could be used by other organizations to access your [facility] medical records  [id number]         Blood Pressure from Last 3 Encounters:   01/03/18 134/83   10/15/17 110/68   11/18/16 123/75    Weight from Last 3 Encounters:   01/03/18 209 lb 3.2 oz (94.9 kg)   10/15/17 207 lb (93.9 kg)   11/28/16 201 lb 9.6 oz (91.4 kg)              We  Performed the Following     FLU VACCINE, SPLIT VIRUS, IM (QUADRIVALENT) [09053]- >3 YRS     Vaccine Administration, Initial [48868]        Primary Care Provider Office Phone # Fax #    Aldo Peralta [phone] [address] [facility] [address]        Equal Access to Services     [facility] : Hadii aad ku hadasho Soomaali, waaxda luqadaha, qaybta kaalmada adeegyada, waxay yanethin hayaan adeeg khbenjaminchen lalucian . So [facility] [phone].    ATENCIÓN: Si habla español, tiene a frazier disposición servicios gratuitos de asistencia lingüística. [facility] [phone].    We comply with applicable federal civil rights laws and [place] laws. We do not discriminate on the basis of race, color, national origin, age, disability, sex, sexual orientation, or gender identity.            Thank you!     Thank you for choosing [facility]  for your care. Our goal is always to provide you with excellent care. Hearing back from our patients is one way we can continue to improve our services. Please take a few minutes to complete the written survey that you may receive in the mail after your visit with us. Thank you!             Your Updated Medication List - Protect others around you: Learn how to safely use, store and throw away your medicines at www.disposemymeds.org.          This list is accurate as of 10/25/18  4:43 PM.  Always use your most recent med list.                   Brand Name Dispense Instructions for use Diagnosis    albuterol 108 (90 Base) MCG/ACT inhaler    PROAIR HFA/PROVENTIL HFA/VENTOLIN HFA    1 Inhaler    Inhale 2 puffs into the lungs every 4 hours as needed for shortness of breath / dyspnea    Chronic seasonal allergic rhinitis due to pollen       amoxicillin 875 MG tablet    AMOXIL    20 tablet    Take 1 tablet (875 mg) by mouth 2 times daily    Acute non-recurrent sinusitis, unspecified location       desogestrel-ethinyl estradiol 0.15-0.02/0.01 MG (21/5) per tablet     VIORELE    84 tablet    Take 1 tablet by mouth daily    Encounter for surveillance of contraceptive pills       sertraline 25 MG tablet    ZOLOFT    90 tablet    TAKE 1 TABLET(25 MG) BY MOUTH DAILY    Generalized anxiety disorder

## 2018-10-25 NOTE — PROGRESS NOTES

## 2018-12-28 DIAGNOSIS — F41.1 GENERALIZED ANXIETY DISORDER: ICD-10-CM

## 2018-12-28 RX ORDER — SERTRALINE HYDROCHLORIDE 25 MG/1
TABLET, FILM COATED ORAL
Qty: 30 TABLET | Refills: 0 | Status: SHIPPED | OUTPATIENT
Start: 2018-12-28 | End: 2019-01-29

## 2018-12-28 NOTE — TELEPHONE ENCOUNTER
"SSRIs Protocol Passed   sertraline (ZOLOFT) 25 MG tablet [Pharmacy Med Name: SERTRALINE 25MG TABLETS]   Rerun Protocol (12/28/2018 11:48 AM)      Recent (12 mo) or future (30 days) visit within the authorizing provider's specialty      Patient had office visit in the last 12 months or has a visit in the next 30 days with authorizing provider or within the authorizing provider's specialty.  See \"Patient Info\" tab in inbasket, or \"Choose Columns\" in Meds & Orders section of the refill encounter.              Patient is age 18 or older         No active pregnancy on record         No positive pregnancy test in last 12 months        Prescription approved per [facility] Refill Protocol.    Pt is due for an office visit in January, 2019.  Sent pt a [employer] message.    Delores Zhang RN    "

## 2019-01-29 DIAGNOSIS — F41.1 GENERALIZED ANXIETY DISORDER: ICD-10-CM

## 2019-01-30 RX ORDER — SERTRALINE HYDROCHLORIDE 25 MG/1
25 TABLET, FILM COATED ORAL DAILY
Qty: 30 TABLET | Refills: 0 | Status: SHIPPED | OUTPATIENT
Start: 2019-01-30 | End: 2019-03-05

## 2019-01-30 NOTE — TELEPHONE ENCOUNTER
"SSRIs Protocol Failed   sertraline (ZOLOFT) 25 MG tablet [Pharmacy Med Name: SERTRALINE 25MG TABLETS]   Rerun Protocol (1/29/2019 4:31 PM)      Recent (12 mo) or future (30 days) visit within the authorizing provider's specialty      Patient had office visit in the last 12 months or has a visit in the next 30 days with authorizing provider or within the authorizing provider's specialty.  See \"Patient Info\" tab in inbasket, or \"Choose Columns\" in Meds & Orders section of the refill encounter.              Medication is active on med list         Patient is age 18 or older         No active pregnancy on record         No positive pregnancy test in last 12 months        Routing refill request to provider for review/approval because:    Patient needs to be seen because it has been more than 1 year since last office visit.  No appt scheduled.  Last filled 12/28/2018 with a one month supply.    Delores Zhang RN        "

## 2019-01-30 NOTE — TELEPHONE ENCOUNTER
1 month refill was sent 12/28/18 and patient was sent a [employer] message regarding need for follow up. Does not appear she has read [employer] message. Will send one additional refill. Please mail letter that she is due to be seen for any further refills. Thank you. Adele SALDIVAR CNP

## 2019-02-08 ENCOUNTER — DOCUMENTATION ONLY (OUTPATIENT)
Dept: LAB | Facility: CLINIC | Age: 39
End: 2019-02-08

## 2019-02-08 NOTE — PROGRESS NOTES
On 19, we sent an over due lab letter to this patient and she has not made an appt. The tests are now  and have been canceled. If you would like her to return to the clinic for a lab only appt, please have your team contact her and place new Future orders.    Thank you, Cheryl Chin MLT

## 2019-02-19 DIAGNOSIS — Z30.41 ENCOUNTER FOR SURVEILLANCE OF CONTRACEPTIVE PILLS: ICD-10-CM

## 2019-02-20 NOTE — TELEPHONE ENCOUNTER
Last seen 1/30/18 by Adele Troy for AFE.  Patient overdue for AFE at this time.  Left voicemail for patient to call.

## 2019-02-25 RX ORDER — DESOGESTREL AND ETHINYL ESTRADIOL AND ETHINYL ESTRADIOL 21-5 (28)
KIT ORAL
OUTPATIENT
Start: 2019-02-25

## 2019-02-25 NOTE — TELEPHONE ENCOUNTER
Return call from patient- scheduled patient at AN OB with Adele on 3/5/19.  Patient does not need a refill at this time- will discuss with Adele at this appointment.

## 2019-03-05 ENCOUNTER — OFFICE VISIT (OUTPATIENT)
Dept: OBGYN | Facility: CLINIC | Age: 39
End: 2019-03-05
Payer: OTHER GOVERNMENT

## 2019-03-05 VITALS
BODY MASS INDEX: 33.37 KG/M2 | HEIGHT: 66 IN | SYSTOLIC BLOOD PRESSURE: 122 MMHG | OXYGEN SATURATION: 96 % | TEMPERATURE: 98 F | HEART RATE: 79 BPM | DIASTOLIC BLOOD PRESSURE: 75 MMHG | WEIGHT: 207.6 LBS

## 2019-03-05 DIAGNOSIS — Z13.6 CARDIOVASCULAR SCREENING; LDL GOAL LESS THAN 130: ICD-10-CM

## 2019-03-05 DIAGNOSIS — Z30.41 ENCOUNTER FOR SURVEILLANCE OF CONTRACEPTIVE PILLS: ICD-10-CM

## 2019-03-05 DIAGNOSIS — Z01.419 ENCOUNTER FOR GYNECOLOGICAL EXAMINATION WITHOUT ABNORMAL FINDING: Primary | ICD-10-CM

## 2019-03-05 DIAGNOSIS — Z13.1 SCREENING FOR DIABETES MELLITUS: ICD-10-CM

## 2019-03-05 DIAGNOSIS — F41.1 GENERALIZED ANXIETY DISORDER: ICD-10-CM

## 2019-03-05 LAB
CHOLEST SERPL-MCNC: 179 MG/DL
GLUCOSE SERPL-MCNC: 82 MG/DL (ref 70–99)
HDLC SERPL-MCNC: 54 MG/DL
LDLC SERPL CALC-MCNC: 106 MG/DL
NONHDLC SERPL-MCNC: 125 MG/DL
TRIGL SERPL-MCNC: 95 MG/DL

## 2019-03-05 PROCEDURE — 82947 ASSAY GLUCOSE BLOOD QUANT: CPT | Performed by: NURSE PRACTITIONER

## 2019-03-05 PROCEDURE — 99395 PREV VISIT EST AGE 18-39: CPT | Performed by: NURSE PRACTITIONER

## 2019-03-05 PROCEDURE — 36415 COLL VENOUS BLD VENIPUNCTURE: CPT | Performed by: NURSE PRACTITIONER

## 2019-03-05 PROCEDURE — 80061 LIPID PANEL: CPT | Performed by: NURSE PRACTITIONER

## 2019-03-05 RX ORDER — DESOGESTREL AND ETHINYL ESTRADIOL 21-5 (28)
1 KIT ORAL DAILY
Qty: 84 TABLET | Refills: 3 | Status: SHIPPED | OUTPATIENT
Start: 2019-03-05 | End: 2020-01-30

## 2019-03-05 RX ORDER — SERTRALINE HYDROCHLORIDE 25 MG/1
25 TABLET, FILM COATED ORAL DAILY
Qty: 90 TABLET | Refills: 3 | Status: SHIPPED | OUTPATIENT
Start: 2019-03-05 | End: 2020-03-30

## 2019-03-05 ASSESSMENT — ANXIETY QUESTIONNAIRES
2. NOT BEING ABLE TO STOP OR CONTROL WORRYING: NOT AT ALL
IF YOU CHECKED OFF ANY PROBLEMS ON THIS QUESTIONNAIRE, HOW DIFFICULT HAVE THESE PROBLEMS MADE IT FOR YOU TO DO YOUR WORK, TAKE CARE OF THINGS AT HOME, OR GET ALONG WITH OTHER PEOPLE: NOT DIFFICULT AT ALL
7. FEELING AFRAID AS IF SOMETHING AWFUL MIGHT HAPPEN: NOT AT ALL
3. WORRYING TOO MUCH ABOUT DIFFERENT THINGS: NOT AT ALL
GAD7 TOTAL SCORE: 2
6. BECOMING EASILY ANNOYED OR IRRITABLE: SEVERAL DAYS
1. FEELING NERVOUS, ANXIOUS, OR ON EDGE: SEVERAL DAYS
5. BEING SO RESTLESS THAT IT IS HARD TO SIT STILL: NOT AT ALL

## 2019-03-05 ASSESSMENT — PATIENT HEALTH QUESTIONNAIRE - PHQ9: 5. POOR APPETITE OR OVEREATING: NOT AT ALL

## 2019-03-05 ASSESSMENT — MIFFLIN-ST. JEOR: SCORE: 1633.42

## 2019-03-05 ASSESSMENT — PAIN SCALES - GENERAL: PAINLEVEL: NO PAIN (0)

## 2019-03-05 NOTE — PROGRESS NOTES
SUBJECTIVE:   CC: Evon Varela is an 38 year old woman who presents for preventive health visit.     Physical   Annual:     Getting at least 3 servings of Calcium per day:  Yes    Bi-annual eye exam:  NO    Dental care twice a year:  Yes    Sleep apnea or symptoms of sleep apnea:  None    Diet:  Regular (no restrictions)    Frequency of exercise:  None    Taking medications regularly:  Yes    Additional concerns today:  No    PHQ-2 Total Score: 0        Today's PHQ-2 Score:   PHQ-2 ( 1999 Pfizer) 3/5/2019   Q1: Little interest or pleasure in doing things 0   Q2: Feeling down, depressed or hopeless 0   PHQ-2 Score 0   Q1: Little interest or pleasure in doing things Not at all   Q2: Feeling down, depressed or hopeless Not at all   PHQ-2 Score 0       Abuse: Current or Past(Physical, Sexual or Emotional)- No  Do you feel safe in your environment? Yes    Social History     Tobacco Use     Smoking status: Never Smoker     Smokeless tobacco: Never Used   Substance Use Topics     Alcohol use: No     Alcohol Use 3/5/2019   If you drink alcohol do you typically have greater than 3 drinks per day OR greater than 7 drinks per week? No   No flowsheet data found.    Reviewed orders with patient.  Reviewed health maintenance and updated orders accordingly - Yes  Patient Active Problem List   Diagnosis     Generalized anxiety disorder     CARDIOVASCULAR SCREENING; LDL GOAL LESS THAN 160     Encounter for supervision of other normal pregnancy     Right ankle pain     Encounter for initial prescription of contraceptive pills     History of thyroid disease     Seasonal allergies     Past Surgical History:   Procedure Laterality Date     ENT SURGERY      deviated septum     ENT SURGERY         Social History     Tobacco Use     Smoking status: Never Smoker     Smokeless tobacco: Never Used   Substance Use Topics     Alcohol use: No     Family History   Problem Relation Age of Onset     Arthritis Mother         osteoarthitis      Thyroid Disease Mother      Breast Cancer Mother      Heart Disease Father      Arthritis Father      Skin Cancer Father      Arthritis Maternal Grandmother            Mammogram Screening: Patient under age 50, mutual decision reflected in health maintenance. Mother diagnosed with breast cancer in late 60s. Will plan baseline after age 40 later this year.      Pertinent mammograms are reviewed under the imaging tab.  History of abnormal Pap smear: NO - age 30-65 PAP every 5 years with negative HPV co-testing recommended  PAP / HPV Latest Ref Rng & Units 1/3/2018 9/25/2014 9/23/2011   PAP - NIL NIL NIL   HPV 16 DNA NEG:Negative Negative - -   HPV 18 DNA NEG:Negative Negative - -   OTHER HR HPV NEG:Negative Negative - -     Reviewed and updated as needed this visit by clinical staff  Tobacco  Allergies  Meds  Med Hx  Surg Hx  Fam Hx  Soc Hx        Reviewed and updated as needed this visit by Provider        Past Medical History:   Diagnosis Date     Seasonal allergies      URI (upper respiratory infection) 1/4/2012      Past Surgical History:   Procedure Laterality Date     ENT SURGERY      deviated septum     ENT SURGERY         Review of Systems  CONSTITUTIONAL: NEGATIVE for fever, chills, change in weight  INTEGUMENTARU/SKIN: NEGATIVE for worrisome rashes, moles or lesions  EYES: NEGATIVE for vision changes or irritation  ENT: NEGATIVE for ear, mouth and throat problems  RESP: NEGATIVE for significant cough or SOB  BREAST: NEGATIVE for masses, tenderness or discharge  CV: NEGATIVE for chest pain, palpitations or peripheral edema  GI: NEGATIVE for nausea, abdominal pain, heartburn, or change in bowel habits  : NEGATIVE for unusual urinary or vaginal symptoms. Periods are regular.  MUSCULOSKELETAL: NEGATIVE for significant arthralgias or myalgia  NEURO: NEGATIVE for weakness, dizziness or paresthesias  PSYCHIATRIC: NEGATIVE for changes in mood or affect. 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 Essentially single parent until then and feels this is not the time to stop medication.     OBJECTIVE:   /75 (BP Location: Right arm, Patient Position: Sitting, Cuff Size: Adult Regular)   Pulse 79   Temp 98  F (36.7  C) (Oral)   Ht 1.676 m (5' 6\")   Wt 94.2 kg (207 lb 9.6 oz)   LMP 02/21/2019 (Approximate)   SpO2 96%   BMI 33.51 kg/m    Physical Exam  GENERAL: healthy, alert and no distress  EYES: Eyes grossly normal to inspection, PERRL and conjunctivae and sclerae normal  HENT: ear canals and TM's normal, nose and mouth without ulcers or lesions  NECK: no adenopathy, no asymmetry, masses, or scars and thyroid normal to palpation  RESP: lungs clear to auscultation - no rales, rhonchi or wheezes  BREAST: normal without masses, tenderness or nipple discharge and no palpable axillary masses or adenopathy  CV: regular rate and rhythm, normal S1 S2, no S3 or S4, no murmur, click or rub, no peripheral edema and peripheral pulses strong  ABDOMEN: soft, nontender, no hepatosplenomegaly, no masses and bowel sounds normal   (female): normal female external genitalia, normal urethral meatus, vaginal mucosa pink, moist, well rugated, and normal cervix/adnexa/uterus without masses or discharge  MS: no gross musculoskeletal defects noted, no edema  SKIN: no suspicious lesions or rashes  NEURO: Normal strength and tone, mentation intact and speech normal  PSYCH: mentation appears normal, affect normal/bright    ASSESSMENT/PLAN:   1. Encounter for gynecological examination without abnormal finding  Pap smear up to date, plan mammogram after birthday.    2. Encounter for surveillance of contraceptive pills  Refill x 1 year as she prefers to continue medication for cycle regulation as well.  - desogestrel-ethinyl estradiol (VIORELE) 0.15-0.02/0.01 MG (21/5) tablet; Take 1 tablet by mouth daily  Dispense: 84 tablet; Refill: 3    3. Generalized anxiety disorder  Well " "managed now, may consider going off later this year after husbands returns. Refill x 1 year, will call if she plans to stop and can discuss weaning.  - sertraline (ZOLOFT) 25 MG tablet; Take 1 tablet (25 mg) by mouth daily  Dispense: 90 tablet; Refill: 3    4. Screening for diabetes mellitus  - Glucose    5. CARDIOVASCULAR SCREENING; LDL GOAL LESS THAN 130  - Lipid panel reflex to direct LDL Fasting    COUNSELING:  Reviewed preventive health counseling, as reflected in patient instructions  Special attention given to:        Regular exercise       Healthy diet/nutrition       Contraception    BP Readings from Last 1 Encounters:   03/05/19 122/75     Estimated body mass index is 33.51 kg/m  as calculated from the following:    Height as of this encounter: 1.676 m (5' 6\").    Weight as of this encounter: 94.2 kg (207 lb 9.6 oz).           reports that  has never smoked. she has never used smokeless tobacco.      Counseling Resources:  ATP IV Guidelines  Pooled Cohorts Equation Calculator  Breast Cancer Risk Calculator  FRAX Risk Assessment  ICSI Preventive Guidelines  Dietary Guidelines for Americans, 2010  USDA's MyPlate  ASA Prophylaxis  Lung CA Screening    JANNA Sibley CNP  [facility]  "

## 2019-03-06 ASSESSMENT — ANXIETY QUESTIONNAIRES: GAD7 TOTAL SCORE: 2

## 2019-11-08 ENCOUNTER — ALLIED HEALTH/NURSE VISIT (OUTPATIENT)
Dept: NURSING | Facility: CLINIC | Age: 39
End: 2019-11-08
Payer: OTHER GOVERNMENT

## 2019-11-08 DIAGNOSIS — Z23 NEED FOR PROPHYLACTIC VACCINATION AND INOCULATION AGAINST INFLUENZA: Primary | ICD-10-CM

## 2019-11-08 PROCEDURE — 90471 IMMUNIZATION ADMIN: CPT

## 2019-11-08 PROCEDURE — 90686 IIV4 VACC NO PRSV 0.5 ML IM: CPT

## 2020-01-30 DIAGNOSIS — Z30.41 ENCOUNTER FOR SURVEILLANCE OF CONTRACEPTIVE PILLS: ICD-10-CM

## 2020-01-30 RX ORDER — DESOGESTREL AND ETHINYL ESTRADIOL AND ETHINYL ESTRADIOL 21-5 (28)
KIT ORAL
Qty: 84 TABLET | Refills: 0 | Status: SHIPPED | OUTPATIENT
Start: 2020-01-30 | End: 2020-04-17

## 2020-01-30 NOTE — TELEPHONE ENCOUNTER
"Contraceptives Protocol Passed   VIORELE 0.15-0.02/0.01 MG (21/5) tablet [Pharmacy Med Name: VIORELE TABLETS 28S]   Rerun Protocol (1/30/2020 12:14 PM)      Patient is not a current smoker if age is 35 or older         Recent (12 mo) or future (30 days) visit within the authorizing provider's specialty      Patient has had an office visit with the authorizing provider or a provider within the authorizing providers department within the previous 12 mos or has a future within next 30 days. See \"Patient Info\" tab in inbasket, or \"Choose Columns\" in Meds & Orders section of the refill encounter.              Medication is active on med list         No active pregnancy on record         No positive pregnancy test in past 12 months        Medication: desogestrel-ethinyl estradiol (VIORELE) 0.15-0.02/0.01 MG (21/5) tablet  Last Written Prescription Date:  3/5/2019  Last Fill Quantity: 84,   # refills: 3  Last Office Visit:3/5/2019  Future Office visit: Not scheduled. Patient will be due for her annual exam on or after 3/5/2020. RN sent [employer] message reminding patient to schedule her annual exam and will send a luis manuel refill to get her through to appointment.    Izabel Amin RN on 1/30/2020 at 12:28 PM          "

## 2020-04-16 DIAGNOSIS — Z30.41 ENCOUNTER FOR SURVEILLANCE OF CONTRACEPTIVE PILLS: ICD-10-CM

## 2020-04-17 RX ORDER — DESOGESTREL AND ETHINYL ESTRADIOL AND ETHINYL ESTRADIOL 21-5 (28)
KIT ORAL
Qty: 84 TABLET | Refills: 0 | Status: SHIPPED | OUTPATIENT
Start: 2020-04-17 | End: 2020-07-10

## 2020-04-17 NOTE — TELEPHONE ENCOUNTER
Prescription approved per [facility] Refill Protocol.  Pt was due for an annual physical exam in March, 2020.  Due to Covid-19 these are being deferred until July, 2020.    Delores Zhang RN

## 2020-07-10 NOTE — PROGRESS NOTES
SUBJECTIVE:   CC: Evon Varela is an 39 year old woman who presents for preventive health visit.     Healthy Habits:    Do you get at least three servings of calcium containing foods daily (dairy, green leafy vegetables, etc.)? yes    Amount of exercise or daily activities, outside of work: none    Problems taking medications regularly No    Medication side effects: No    Have you had an eye exam in the past two years? yes    Do you see a dentist twice per year? yes    Do you have sleep apnea, excessive snoring or daytime drowsiness?no    Requests refills on medications. No concerns or problems with any of the medications. Low dose Zoloft helps manage her anxiety well.    Abuse: Current or Past(Physical, Sexual or Emotional)- No  Do you feel safe in your environment? Yes    PHQ-9 score:    PHQ 7/13/2020   PHQ-9 Total Score 0   Q9: Thoughts of better off dead/self-harm past 2 weeks Not at all       Social History     Tobacco Use     Smoking status: Never Smoker     Smokeless tobacco: Never Used   Substance Use Topics     Alcohol use: No     If you drink alcohol do you typically have >3 drinks per day or >7 drinks per week? No                     Reviewed orders with patient.  Reviewed health maintenance and updated orders accordingly - Yes  Patient Active Problem List   Diagnosis     Generalized anxiety disorder     CARDIOVASCULAR SCREENING; LDL GOAL LESS THAN 160     Encounter for supervision of other normal pregnancy     Right ankle pain     Encounter for initial prescription of contraceptive pills     History of thyroid disease     Seasonal allergies     Past Surgical History:   Procedure Laterality Date     ENT SURGERY      deviated septum     ENT SURGERY         Social History     Tobacco Use     Smoking status: Never Smoker     Smokeless tobacco: Never Used   Substance Use Topics     Alcohol use: No     Family History   Problem Relation Age of Onset     Arthritis Mother         osteoarthitis     Thyroid  Disease Mother      Breast Cancer Mother      Heart Disease Father      Arthritis Father      Skin Cancer Father      Arthritis Maternal Grandmother            Mammogram Screening: Patient under age 50, mutual decision reflected in health maintenance.      Pertinent mammograms are reviewed under the imaging tab.  History of abnormal Pap smear: NO - age 30-65 PAP every 5 years with negative HPV co-testing recommended  PAP / HPV Latest Ref Rng & Units 1/3/2018 9/25/2014 9/23/2011   PAP - NIL NIL NIL   HPV 16 DNA NEG:Negative Negative - -   HPV 18 DNA NEG:Negative Negative - -   OTHER HR HPV NEG:Negative Negative - -     Reviewed and updated as needed this visit by clinical staff  Tobacco  Allergies  Meds  Problems  Med Hx  Surg Hx  Fam Hx  Soc Hx          Reviewed and updated as needed this visit by Provider  Tobacco  Allergies  Meds  Problems  Med Hx  Surg Hx  Fam Hx  Soc Hx         Past Medical History:   Diagnosis Date     Seasonal allergies      URI (upper respiratory infection) 1/4/2012      Past Surgical History:   Procedure Laterality Date     ENT SURGERY      deviated septum     ENT SURGERY         ROS:  CONSTITUTIONAL: NEGATIVE for fever, chills, change in weight  INTEGUMENTARU/SKIN: NEGATIVE for worrisome rashes, moles or lesions  EYES: NEGATIVE for vision changes or irritation  ENT: NEGATIVE for ear, mouth and throat problems  RESP: NEGATIVE for significant cough or SOB  BREAST: NEGATIVE for masses, tenderness or discharge  CV: NEGATIVE for chest pain, palpitations or peripheral edema  GI: NEGATIVE for nausea, abdominal pain, heartburn, or change in bowel habits  : NEGATIVE for unusual urinary or vaginal symptoms. Periods are regular.  MUSCULOSKELETAL: NEGATIVE for significant arthralgias or myalgia  NEURO: NEGATIVE for weakness, dizziness or paresthesias  PSYCHIATRIC: NEGATIVE for changes in mood or affect    OBJECTIVE:   /82 (BP Location: Right arm, Patient Position: Sitting, Cuff  "Size: Adult Regular)   Pulse 78   Temp 97.3  F (36.3  C) (Tympanic)   Ht 1.67 m (5' 5.75\")   Wt 96.4 kg (212 lb 9.6 oz)   LMP 06/26/2020 (Approximate)   SpO2 97%   BMI 34.58 kg/m    EXAM:  GENERAL: healthy, alert and no distress  EYES: Eyes grossly normal to inspection, PERRL and conjunctivae and sclerae normal  HENT: ear canals and TM's normal, nose and mouth without ulcers or lesions  NECK: no adenopathy, no asymmetry, masses, or scars and thyroid normal to palpation  RESP: lungs clear to auscultation - no rales, rhonchi or wheezes  BREAST: normal without masses, tenderness or nipple discharge and no palpable axillary masses or adenopathy  CV: regular rate and rhythm, normal S1 S2, no S3 or S4, no murmur, click or rub, no peripheral edema and peripheral pulses strong  ABDOMEN: soft, nontender, no hepatosplenomegaly, no masses and bowel sounds normal   (female): normal female external genitalia, normal urethral meatus, vaginal mucosa pink, moist, well rugated, and normal cervix/adnexa/uterus without masses or discharge  MS: no gross musculoskeletal defects noted, no edema  SKIN: no suspicious lesions or rashes  NEURO: Normal strength and tone, mentation intact and speech normal  PSYCH: mentation appears normal, affect normal/bright    ASSESSMENT/PLAN:   1. Encounter for gynecological examination without abnormal finding  Pap smear up to date    2. Need for Tdap vaccination  - TDAP VACCINE (ADACEL) [16031.002]  - 1st  Administration  [41341]    3. Generalized anxiety disorder  Well managed with low dose, refill x 1 year.  - sertraline (ZOLOFT) 25 MG tablet; Take 1 tablet (25 mg) by mouth daily  Dispense: 90 tablet; Refill: 3    4. Encounter for surveillance of contraceptive pills  - desogestrel-ethinyl estradiol (VIORELE) 0.15-0.02/0.01 MG (21/5) tablet; Take 1 tablet by mouth daily  Dispense: 84 tablet; Refill: 3    5. Chronic seasonal allergic rhinitis due to pollen  - albuterol (PROAIR HFA/PROVENTIL " "HFA/VENTOLIN HFA) 108 (90 Base) MCG/ACT inhaler; Inhale 2 puffs into the lungs every 4 hours as needed for shortness of breath / dyspnea  Dispense: 1 Inhaler; Refill: 2    6. Screening for diabetes mellitus  - Glucose    7. CARDIOVASCULAR SCREENING; LDL GOAL LESS THAN 130  - Lipid panel reflex to direct LDL Fasting    8. Encounter for screening mammogram for breast cancer  - MA Screening Digital Bilateral; Future    COUNSELING:   Reviewed preventive health counseling, as reflected in patient instructions  Special attention given to:        Regular exercise       Healthy diet/nutrition       Contraception    Estimated body mass index is 34.58 kg/m  as calculated from the following:    Height as of this encounter: 1.67 m (5' 5.75\").    Weight as of this encounter: 96.4 kg (212 lb 9.6 oz).    Weight management plan: Discussed healthy diet and exercise guidelines     reports that she has never smoked. She has never used smokeless tobacco.      Counseling Resources:  ATP IV Guidelines  Pooled Cohorts Equation Calculator  Breast Cancer Risk Calculator  FRAX Risk Assessment  ICSI Preventive Guidelines  Dietary Guidelines for Americans, 2010  USDA's MyPlate  ASA Prophylaxis  Lung CA Screening    JANNA Sibley [facility]  "

## 2020-07-13 ENCOUNTER — OFFICE VISIT (OUTPATIENT)
Dept: OBGYN | Facility: CLINIC | Age: 40
End: 2020-07-13
Payer: OTHER GOVERNMENT

## 2020-07-13 VITALS
HEIGHT: 66 IN | DIASTOLIC BLOOD PRESSURE: 82 MMHG | TEMPERATURE: 97.3 F | BODY MASS INDEX: 34.17 KG/M2 | WEIGHT: 212.6 LBS | OXYGEN SATURATION: 97 % | HEART RATE: 78 BPM | SYSTOLIC BLOOD PRESSURE: 124 MMHG

## 2020-07-13 DIAGNOSIS — F41.1 GENERALIZED ANXIETY DISORDER: ICD-10-CM

## 2020-07-13 DIAGNOSIS — Z01.419 ENCOUNTER FOR GYNECOLOGICAL EXAMINATION WITHOUT ABNORMAL FINDING: Primary | ICD-10-CM

## 2020-07-13 DIAGNOSIS — J30.1 CHRONIC SEASONAL ALLERGIC RHINITIS DUE TO POLLEN: ICD-10-CM

## 2020-07-13 DIAGNOSIS — Z13.6 CARDIOVASCULAR SCREENING; LDL GOAL LESS THAN 130: ICD-10-CM

## 2020-07-13 DIAGNOSIS — Z30.41 ENCOUNTER FOR SURVEILLANCE OF CONTRACEPTIVE PILLS: ICD-10-CM

## 2020-07-13 DIAGNOSIS — Z12.31 ENCOUNTER FOR SCREENING MAMMOGRAM FOR BREAST CANCER: ICD-10-CM

## 2020-07-13 DIAGNOSIS — Z23 NEED FOR TDAP VACCINATION: ICD-10-CM

## 2020-07-13 DIAGNOSIS — Z13.1 SCREENING FOR DIABETES MELLITUS: ICD-10-CM

## 2020-07-13 LAB
CHOLEST SERPL-MCNC: 193 MG/DL
GLUCOSE SERPL-MCNC: 82 MG/DL (ref 70–99)
HDLC SERPL-MCNC: 64 MG/DL
LDLC SERPL CALC-MCNC: 105 MG/DL
NONHDLC SERPL-MCNC: 129 MG/DL
TRIGL SERPL-MCNC: 122 MG/DL

## 2020-07-13 PROCEDURE — 80061 LIPID PANEL: CPT | Performed by: NURSE PRACTITIONER

## 2020-07-13 PROCEDURE — 99396 PREV VISIT EST AGE 40-64: CPT | Mod: 25 | Performed by: NURSE PRACTITIONER

## 2020-07-13 PROCEDURE — 82947 ASSAY GLUCOSE BLOOD QUANT: CPT | Performed by: NURSE PRACTITIONER

## 2020-07-13 PROCEDURE — 36415 COLL VENOUS BLD VENIPUNCTURE: CPT | Performed by: NURSE PRACTITIONER

## 2020-07-13 PROCEDURE — 90715 TDAP VACCINE 7 YRS/> IM: CPT | Performed by: NURSE PRACTITIONER

## 2020-07-13 PROCEDURE — 90471 IMMUNIZATION ADMIN: CPT | Performed by: NURSE PRACTITIONER

## 2020-07-13 RX ORDER — DESOGESTREL AND ETHINYL ESTRADIOL 21-5 (28)
1 KIT ORAL DAILY
Qty: 84 TABLET | Refills: 3 | Status: SHIPPED | OUTPATIENT
Start: 2020-07-13 | End: 2021-06-21

## 2020-07-13 RX ORDER — ALBUTEROL SULFATE 90 UG/1
2 AEROSOL, METERED RESPIRATORY (INHALATION) EVERY 4 HOURS PRN
Qty: 1 INHALER | Refills: 2 | Status: SHIPPED | OUTPATIENT
Start: 2020-07-13 | End: 2022-02-01

## 2020-07-13 RX ORDER — SERTRALINE HYDROCHLORIDE 25 MG/1
25 TABLET, FILM COATED ORAL DAILY
Qty: 90 TABLET | Refills: 3 | Status: SHIPPED | OUTPATIENT
Start: 2020-07-13 | End: 2021-07-14

## 2020-07-13 ASSESSMENT — ANXIETY QUESTIONNAIRES
GAD7 TOTAL SCORE: 2
2. NOT BEING ABLE TO STOP OR CONTROL WORRYING: NOT AT ALL
3. WORRYING TOO MUCH ABOUT DIFFERENT THINGS: NOT AT ALL
6. BECOMING EASILY ANNOYED OR IRRITABLE: SEVERAL DAYS
5. BEING SO RESTLESS THAT IT IS HARD TO SIT STILL: NOT AT ALL
IF YOU CHECKED OFF ANY PROBLEMS ON THIS QUESTIONNAIRE, HOW DIFFICULT HAVE THESE PROBLEMS MADE IT FOR YOU TO DO YOUR WORK, TAKE CARE OF THINGS AT HOME, OR GET ALONG WITH OTHER PEOPLE: NOT DIFFICULT AT ALL
7. FEELING AFRAID AS IF SOMETHING AWFUL MIGHT HAPPEN: NOT AT ALL
1. FEELING NERVOUS, ANXIOUS, OR ON EDGE: SEVERAL DAYS

## 2020-07-13 ASSESSMENT — PATIENT HEALTH QUESTIONNAIRE - PHQ9
5. POOR APPETITE OR OVEREATING: NOT AT ALL
SUM OF ALL RESPONSES TO PHQ QUESTIONS 1-9: 0

## 2020-07-13 ASSESSMENT — MIFFLIN-ST. JEOR: SCORE: 1647.13

## 2020-07-13 ASSESSMENT — PAIN SCALES - GENERAL: PAINLEVEL: NO PAIN (0)

## 2020-07-13 NOTE — PROGRESS NOTES
Prior to immunization administration, verified patients identity using patient s name and date of birth. Please see Immunization Activity for additional information.     Screening Questionnaire for Adult Immunization    Are you sick today?   No   Do you have allergies to medications, food, a vaccine component or latex?   No   Have you ever had a serious reaction after receiving a vaccination?   No   Do you have a long-term health problem with heart, lung, kidney, or metabolic disease (e.g., diabetes), asthma, a blood disorder, no spleen, complement component deficiency, a cochlear implant, or a spinal fluid leak?  Are you on long-term aspirin therapy?   No   Do you have cancer, leukemia, HIV/AIDS, or any other immune system problem?   No   Do you have a parent, brother, or sister with an immune system problem?   No   In the past 3 months, have you taken medications that affect  your immune system, such as prednisone, other steroids, or anticancer drugs; drugs for the treatment of rheumatoid arthritis, Crohn s disease, or psoriasis; or have you had radiation treatments?   No   Have you had a seizure, or a brain or other nervous system problem?   No   During the past year, have you received a transfusion of blood or blood    products, or been given immune (gamma) globulin or antiviral drug?   No   For women: Are you pregnant or is there a chance you could become       pregnant during the next month?   No   Have you received any vaccinations in the past 4 weeks?   No     Immunization questionnaire answers were all negative.        Per orders of Adele SALDIVAR CNP, injection of Tdap given by Nicol Mazariegos CMA. Patient instructed to remain in clinic for 15 minutes afterwards, and to report any adverse reaction to me immediately.       Screening performed by Nicol Mazariegos CMA on 7/13/2020 at 8:13 AM.

## 2020-07-14 ASSESSMENT — ANXIETY QUESTIONNAIRES: GAD7 TOTAL SCORE: 2

## 2020-11-11 ENCOUNTER — VIRTUAL VISIT (OUTPATIENT)
Dept: FAMILY MEDICINE | Facility: OTHER | Age: 40
End: 2020-11-11

## 2020-11-12 NOTE — PROGRESS NOTES
"Date: 2020 16:45:50  Clinician: Andre Dixon  Clinician NPI: [id number]  Patient: Nichole Barnard  Patient : 1980  Patient Address: [address] [place] [id number]  Patient Phone: [phone]  Visit Protocol: URI  Patient Summary:  Nichole is a 40 year old ( : 1980 ) female who initiated a [facility] Visit for COVID-19 (Coronavirus) evaluation and screening. When asked the question \"Please sign me up to receive news, health information and promotions. \", Nichole responded \"No\".    When asked when her symptoms started, Nichole reported that she does not have any symptoms.   She denies taking antibiotic medication in the past month and having recent facial or sinus surgery in the past 60 days.    Pertinent COVID-19 (Coronavirus) information  Nichole does not work or volunteer as healthcare worker or a . In the past 14 days, Nichole has not worked or volunteered at a healthcare facility or group living setting.   In the past 14 days, she also has not lived in a congregate living setting.   Nichole has had a close contact with a laboratory-confirmed COVID-19 patient in the last 14 days. She was not exposed at her work. Date Nichole was exposed to the laboratory-confirmed COVID-19 patient: 2020   Additional information about contact with COVID-19 (Coronavirus) patient as reported by the patient (free text): we have had several people we have been in contact with test positive over the last week.   Nichole is not living in the same household with the COVID-19 positive patient. She was in an enclosed space for greater than 15 minutes with the COVID-19 patient.   During the encounter, neither were wearing masks.   Since 2019, Nichole has not been tested for COVID-19 and has not had upper respiratory infection or influenza-like illness.   Pertinent medical history  Nichole does not get yeast infections when she takes antibiotics.   Nichole does not " need a return to work/school note.   Weight: 210 lbs   Nichole does not smoke or use smokeless tobacco.   She denies pregnancy and denies breastfeeding. She is currently menstruating.   Weight: 210 lbs    MEDICATIONS: No current medications, ALLERGIES: NKDA  Clinician Response:  Dear Nichole,   Based on your exposure to COVID-19 (coronavirus), we would like to test you for this virus.  1. Please call [phone] to schedule your visit. Explain that you were referred by [facility] to have a COVID-19 test. Be ready to share your [facility] visit ID number.  * If you need to schedule in [facility] please call [phone] or for [facility] employees please call [phone].   * If you need to schedule in the [place] area please call [phone]. [employer] employees call [phone].   The following will serve as your written order for this COVID Test, ordered by me, for the indication of suspected COVID [Z20.828]: The test will be ordered in [employer], our electronic health record, after you are scheduled. It will show as ordered and authorized by Uli Sanchez MD.  Order: COVID-19 (coronavirus) PCR for ASYMPTOMATIC EXPOSURE testing from [facility].   If you know you have had close contact with someone who tested positive, you should be quarantined for 14 days after this exposure. You should stay in quarantine for the14 days even if the covid test is negative, the optimal time to test after exposure is 5-7 days from the exposure  Quarantine means   What should I do?  For safety, it's very important to follow these rules. Do this for 14 days after the date you were last exposed to the virus..  Stay home and away from others. Don't go to school or anywhere else. Generally quarantine means staying home from work but there are some exceptions to this. Please contact your workplace.   No hugging, kissing or shaking hands.  Don't let anyone visit.  Cover your mouth and nose with a mask, tissue or washcloth to avoid spreading germs.  Wash  your hands and face often. Use soap and water.  What are the symptoms of COVID-19?  The most common symptoms are cough, fever and trouble breathing. Less common symptoms include headache, body aches, fatigue (feeling very tired), chills, sore throat, stuffy or runny nose, diarrhea (loose poop), loss of taste or smell, belly pain, and nausea or vomiting (feeling sick to your stomach or throwing up).  After 14 days, if you have still don't have symptoms, you likely don't have this virus.  If you develop symptoms, follow these guidelines.  If you're normally healthy: Please start another [facility] visit to report your symptoms. Go to [email or web address].  If you have a serious health problem (like cancer, heart failure, an organ transplant or kidney disease): Call your specialty clinic. Let them know that you might have COVID-19.  2. When it's time for your COVID test:  Stay at least 6 feet away from others. (If someone will drive you to your test, stay in the backseat, as far away from the  as you can.)  Cover your mouth and nose with a mask, tissue or washcloth.  Go straight to the testing site. Don't make any stops on the way there or back.  Please note  Caregivers in these groups are at risk for severe illness due to COVID-19:  o People 65 years and older  o People who live in a nursing home or long-term care facility  o People with chronic disease (lung, heart, cancer, diabetes, kidney, liver, immunologic)  o People who have a weakened immune system, including those who:  Are in cancer treatment  Take medicine that weakens the immune system, such as corticosteroids  Had a bone marrow or organ transplant  Have an immune deficiency  Have poorly controlled HIV or AIDS  Are obese (body mass index of 40 or higher)  Smoke regularly  Where can I get more information?   [facility] -- About COVID-19: [email or web address]  CDC -- What to Do If You're Sick:  www.cdc.gov/coronavirus/2019-ncov/about/steps-when-sick.html  CDC -- Ending Home Isolation: www.cdc.gov/coronavirus/2019-ncov/hcp/disposition-in-home-patients.html  [facility] -- Caring for Someone: www.cdc.gov/coronavirus/2019-ncov/if-you-are-sick/care-for-someone.html  [facility] -- Interim Guidance for Hospital Discharge to Home: www.[facility].mn./diseases/coronavirus/hcp/hospdischarge.pdf  [facility] clinical trials (COVID-19 research studies): clinicalaffairs.[facility]-clinical-trials  Below are the COVID-19 hotlines at the [place] Department of Health ([facility]). Interpreters are available.  For health questions: Call 896-443-2153 or 1-751.190.5754 (7 a.m. to 7 p.m.)  For questions about schools and childcare: Call 180-993-0924 or 1-490.945.2205 (7 a.m. to 7 p.m.)    Diagnosis: Contact with and (suspected) exposure to other viral communicable diseases  Diagnosis ICD: Z20.828

## 2021-06-21 DIAGNOSIS — Z30.41 ENCOUNTER FOR SURVEILLANCE OF CONTRACEPTIVE PILLS: ICD-10-CM

## 2021-06-21 RX ORDER — DESOGESTREL AND ETHINYL ESTRADIOL 21-5 (28)
1 KIT ORAL DAILY
Qty: 28 TABLET | Refills: 0 | Status: SHIPPED | OUTPATIENT
Start: 2021-06-21 | End: 2021-07-14

## 2021-06-21 NOTE — LETTER
June 21, 2021    Evon Varela  [address] [facility] [address]    Dear Evon,       We recently received a refill request for desogestrel-ethinyl estradiol (VIORELE) .  We have refilled this for a one time supply only because you are due for a:    Birth Control office visit for FURTHER REFILLS in the next month.      Please call at your earliest convenience so that there will not be a delay with your future refills.          Thank you,   Your [facility] Team/[facility]  [phone]

## 2021-07-14 ENCOUNTER — OFFICE VISIT (OUTPATIENT)
Dept: OBGYN | Facility: CLINIC | Age: 41
End: 2021-07-14
Payer: OTHER GOVERNMENT

## 2021-07-14 VITALS
DIASTOLIC BLOOD PRESSURE: 75 MMHG | WEIGHT: 205.6 LBS | HEART RATE: 84 BPM | SYSTOLIC BLOOD PRESSURE: 119 MMHG | HEIGHT: 66 IN | OXYGEN SATURATION: 95 % | TEMPERATURE: 97.9 F | BODY MASS INDEX: 33.04 KG/M2

## 2021-07-14 DIAGNOSIS — F41.1 GENERALIZED ANXIETY DISORDER: ICD-10-CM

## 2021-07-14 DIAGNOSIS — Z01.419 ENCOUNTER FOR GYNECOLOGICAL EXAMINATION WITHOUT ABNORMAL FINDING: Primary | ICD-10-CM

## 2021-07-14 DIAGNOSIS — Z30.41 ENCOUNTER FOR SURVEILLANCE OF CONTRACEPTIVE PILLS: ICD-10-CM

## 2021-07-14 DIAGNOSIS — Z12.31 BREAST CANCER SCREENING BY MAMMOGRAM: ICD-10-CM

## 2021-07-14 PROCEDURE — 99396 PREV VISIT EST AGE 40-64: CPT | Performed by: NURSE PRACTITIONER

## 2021-07-14 PROCEDURE — 99213 OFFICE O/P EST LOW 20 MIN: CPT | Mod: 25 | Performed by: NURSE PRACTITIONER

## 2021-07-14 RX ORDER — SERTRALINE HYDROCHLORIDE 25 MG/1
50 TABLET, FILM COATED ORAL DAILY
Qty: 180 TABLET | Refills: 3 | Status: SHIPPED | OUTPATIENT
Start: 2021-07-14 | End: 2022-08-29

## 2021-07-14 RX ORDER — DESOGESTREL AND ETHINYL ESTRADIOL 21-5 (28)
1 KIT ORAL DAILY
Qty: 84 TABLET | Refills: 3 | Status: SHIPPED | OUTPATIENT
Start: 2021-07-14 | End: 2022-05-23

## 2021-07-14 ASSESSMENT — ANXIETY QUESTIONNAIRES
7. FEELING AFRAID AS IF SOMETHING AWFUL MIGHT HAPPEN: NOT AT ALL
3. WORRYING TOO MUCH ABOUT DIFFERENT THINGS: NOT AT ALL
GAD7 TOTAL SCORE: 4
1. FEELING NERVOUS, ANXIOUS, OR ON EDGE: MORE THAN HALF THE DAYS
6. BECOMING EASILY ANNOYED OR IRRITABLE: MORE THAN HALF THE DAYS
5. BEING SO RESTLESS THAT IT IS HARD TO SIT STILL: NOT AT ALL
8. IF YOU CHECKED OFF ANY PROBLEMS, HOW DIFFICULT HAVE THESE MADE IT FOR YOU TO DO YOUR WORK, TAKE CARE OF THINGS AT HOME, OR GET ALONG WITH OTHER PEOPLE?: NOT DIFFICULT AT ALL
7. FEELING AFRAID AS IF SOMETHING AWFUL MIGHT HAPPEN: NOT AT ALL
GAD7 TOTAL SCORE: 4
GAD7 TOTAL SCORE: 4
4. TROUBLE RELAXING: NOT AT ALL
2. NOT BEING ABLE TO STOP OR CONTROL WORRYING: NOT AT ALL

## 2021-07-14 ASSESSMENT — PAIN SCALES - GENERAL: PAINLEVEL: NO PAIN (0)

## 2021-07-14 ASSESSMENT — MIFFLIN-ST. JEOR: SCORE: 1610.38

## 2021-07-15 ASSESSMENT — ANXIETY QUESTIONNAIRES: GAD7 TOTAL SCORE: 4

## 2021-09-26 ENCOUNTER — HEALTH MAINTENANCE LETTER (OUTPATIENT)
Age: 41
End: 2021-09-26

## 2022-02-01 ENCOUNTER — OFFICE VISIT (OUTPATIENT)
Dept: FAMILY MEDICINE | Facility: CLINIC | Age: 42
End: 2022-02-01
Payer: OTHER GOVERNMENT

## 2022-02-01 ENCOUNTER — NURSE TRIAGE (OUTPATIENT)
Dept: FAMILY MEDICINE | Facility: CLINIC | Age: 42
End: 2022-02-01

## 2022-02-01 VITALS
BODY MASS INDEX: 34.39 KG/M2 | DIASTOLIC BLOOD PRESSURE: 84 MMHG | RESPIRATION RATE: 20 BRPM | SYSTOLIC BLOOD PRESSURE: 138 MMHG | WEIGHT: 214 LBS | HEART RATE: 99 BPM | HEIGHT: 66 IN | TEMPERATURE: 98 F | OXYGEN SATURATION: 96 %

## 2022-02-01 DIAGNOSIS — R05.9 COUGH: Primary | ICD-10-CM

## 2022-02-01 PROCEDURE — U0003 INFECTIOUS AGENT DETECTION BY NUCLEIC ACID (DNA OR RNA); SEVERE ACUTE RESPIRATORY SYNDROME CORONAVIRUS 2 (SARS-COV-2) (CORONAVIRUS DISEASE [COVID-19]), AMPLIFIED PROBE TECHNIQUE, MAKING USE OF HIGH THROUGHPUT TECHNOLOGIES AS DESCRIBED BY CMS-2020-01-R: HCPCS | Mod: 90 | Performed by: FAMILY MEDICINE

## 2022-02-01 PROCEDURE — 99000 SPECIMEN HANDLING OFFICE-LAB: CPT | Performed by: FAMILY MEDICINE

## 2022-02-01 PROCEDURE — U0005 INFEC AGEN DETEC AMPLI PROBE: HCPCS | Mod: 90 | Performed by: FAMILY MEDICINE

## 2022-02-01 PROCEDURE — 99203 OFFICE O/P NEW LOW 30 MIN: CPT | Performed by: FAMILY MEDICINE

## 2022-02-01 RX ORDER — ALBUTEROL SULFATE 90 UG/1
2 AEROSOL, METERED RESPIRATORY (INHALATION) EVERY 4 HOURS PRN
Qty: 18 G | Refills: 4 | Status: SHIPPED | OUTPATIENT
Start: 2022-02-01

## 2022-02-01 RX ORDER — BENZONATATE 200 MG/1
200 CAPSULE ORAL 3 TIMES DAILY PRN
Qty: 30 CAPSULE | Refills: 0 | Status: SHIPPED | OUTPATIENT
Start: 2022-02-01 | End: 2022-10-11

## 2022-02-01 ASSESSMENT — PAIN SCALES - GENERAL: PAINLEVEL: MODERATE PAIN (5)

## 2022-02-01 ASSESSMENT — MIFFLIN-ST. JEOR: SCORE: 1647.45

## 2022-02-01 NOTE — PATIENT INSTRUCTIONS
Mucinex DM for cough, need to drink enough water to help it work.     Tessalon and albuterol from pharmacy

## 2022-02-01 NOTE — TELEPHONE ENCOUNTER
"Cough is now, non productive, previously productive.  Home Covid 19 test last week negative, 1 week ago, several prior to that all negative.  Spouse was positive ~ 2 - 3  weeks ago.  + Vaccinated.   Symptoms started, 1/24/22.   Patient reports wheezing last night, prior to bed.  + wheezing now.  \"Need to focus on my breathing, unable to take a full deep breath\".  Speaking in full clear sentences.  Denies shortness of breath, walking across the room.  Afebrile.  Denies Heart or lung medical history.  Denies travel, bedridden, surgery.  \"my nose feels plugged\".  Chest pain, few times over the past week, \"not consistent\".  Not occurring now.      Reason for Disposition    Wheezing is present    Additional Information    Negative: Bluish (or gray) lips or face    Negative: Severe difficulty breathing (e.g., struggling for each breath, speaks in single words)    Negative: Rapid onset of cough and has hives    Negative: Coughing started suddenly after medicine, an allergic food or bee sting    Negative: Difficulty breathing after exposure to flames, smoke, or fumes    Negative: Sounds like a life-threatening emergency to the triager    Negative: Previous asthma attacks and this feels like asthma attack    Negative: Chest pain present when not coughing    Negative: Difficulty breathing    Negative: Passed out (i.e., fainted, collapsed and was not responding)    Negative: Patient sounds very sick or weak to the triager    Negative: Coughed up > 1 tablespoon (15 ml) blood (Exception: blood-tinged sputum)    Negative: Fever > 103 F (39.4 C)    Negative: Fever > 101 F (38.3 C) and over 60 years of age    Negative: Fever > 100.0 F (37.8 C) and has diabetes mellitus or a weak immune system (e.g., HIV positive, cancer chemotherapy, organ transplant, splenectomy, chronic steroids)    Negative: Fever > 100.0 F (37.8 C) and bedridden (e.g., nursing home patient, stroke, chronic illness, recovering from surgery)    Negative: " Increasing ankle swelling    Protocols used: COUGH-A-OH    Patient is advise evaluation.    Next 5 appointments (look out 90 days)    Feb 01, 2022  2:25 PM  (Arrive by 2:05 PM)  Provider Visit with Angela Resendez MD  [facility] ([facility] ) [address] [facility] [address] [phone]        Patient/parent verbalized understanding of instructions provided and agreed with the plan of care  Jennyfer Concepcion RN

## 2022-02-01 NOTE — NURSING NOTE
"Chief Complaint   Patient presents with     Cough       Initial /84   Pulse 99   Temp 98  F (36.7  C) (Oral)   Resp 20   Ht 1.676 m (5' 6\")   Wt 97.1 kg (214 lb)   LMP 01/28/2022   SpO2 96%   BMI 34.54 kg/m   Estimated body mass index is 34.54 kg/m  as calculated from the following:    Height as of this encounter: 1.676 m (5' 6\").    Weight as of this encounter: 97.1 kg (214 lb).  Medication Reconciliation: complete  Ganesh Collins CMA    "

## 2022-02-01 NOTE — PROGRESS NOTES
"  SUBJECTIVE:  Evon Varela is a 41 year old female who presents with the following concerns;              Symptoms: cc Present Absent Comment   Fever/Chills  x  At onset of sympomts had chills, has resolved    Fatigue  x  Worse at beginning of symptoms    Muscle Aches  x  Noted last week    Eye Irritation   x    Sneezing  x  Off and on    Nasal Jake/Drg  x     Sinus Pressure/Pain  x  Off and on    Loss of smell   x    Dental pain   x    Sore Throat  x  Off and on    Swollen Glands   x    Ear Pain/Fullness  x  Fullness    Cough  x     Wheeze  x  Wheeze began last night    Chest Pain  x  A couple occasions, mid sternal pain noted today.  Noting tightness, ULQ pain    Shortness of breath  x  Having to focus to get deep breath    Rash   x    Other   x      Symptom duration:  8 days    Sympom severity:  moderate    Treatments tried:  warm liquids, dayquil, nyquil, allergy meds    Contacts:  1/10- son home with cold, negative for covid 1/14,  positive for covid 1/14    Pt has tested negative on home tests  Main concern is wheezing.    Medications updated and reviewed.  Past, family and surgical history is updated and reviewed in the record.  ROS:  Other than noted above, general, HEENT, respiratory, cardiac and gastrointestinal systems are negative.  OBJECTIVE:  /84   Pulse 99   Temp 98  F (36.7  C) (Oral)   Resp 20   Ht 1.676 m (5' 6\")   Wt 97.1 kg (214 lb)   LMP 01/28/2022   SpO2 96%   BMI 34.54 kg/m    GENERAL: Pleasant and interactive. No acute distress.  HEENT: Mild injection of conjunctiva.  Clear nasal discharge.  Oropharynx moist and clear.   NECK: supple and free of adenopathy or masses, the thyroid is normal without enlargement or nodules.  CHEST:  clear, no wheezing or rales. Normal symmetric air entry throughout both lung fields. No chest wall deformities or tenderness.  HEART:  S1 and S2 normal, no murmurs, clicks, gallops or rubs. Regular rate and rhythm.  SKIN:  Only benign skin " findings. No unusual rashes or suspicious skin lesions noted. Nails appear normal.    Assessment:  (R05.9) Cough  (primary encounter diagnosis)  Comment: likely post infectious cough, no wheezing noted on exam  Plan: benzonatate (TESSALON) 200 MG capsule,         albuterol (PROAIR HFA/PROVENTIL HFA/VENTOLIN         HFA) 108 (90 Base) MCG/ACT inhaler,         Symptomatic; Yes; 1/16/2022 COVID-19 Virus         (Coronavirus) by PCR Nose, Symptomatic; Yes;         1/16/2022 COVID-19 Virus (Coronavirus) by PCR         Nose        Tessalon, albuterol inhaler  covid testing completed with PPE in place

## 2022-02-02 LAB — SARS-COV-2 RNA RESP QL NAA+PROBE: NOT DETECTED

## 2022-04-14 ENCOUNTER — MYC MEDICAL ADVICE (OUTPATIENT)
Dept: OBGYN | Facility: CLINIC | Age: 42
End: 2022-04-14
Payer: OTHER GOVERNMENT

## 2022-05-22 DIAGNOSIS — Z30.41 ENCOUNTER FOR SURVEILLANCE OF CONTRACEPTIVE PILLS: ICD-10-CM

## 2022-05-23 RX ORDER — DESOGESTREL AND ETHINYL ESTRADIOL AND ETHINYL ESTRADIOL 21-5 (28)
KIT ORAL
Qty: 84 TABLET | Refills: 0 | Status: SHIPPED | OUTPATIENT
Start: 2022-05-23 | End: 2022-08-11

## 2022-05-23 NOTE — TELEPHONE ENCOUNTER
"Requested Prescriptions   Pending Prescriptions Disp Refills     VIORELE 0.15-0.02/0.01 MG (21/5) tablet [Pharmacy Med Name: VIORELE TABLETS 28S] 84 tablet 3     Sig: TAKE 1 TABLET BY MOUTH DAILY       Contraceptives Protocol Passed - 5/22/2022  8:19 PM        Passed - Patient is not a current smoker if age is 35 or older        Passed - Recent (12 mo) or future (30 days) visit within the authorizing provider's specialty     Patient has had an office visit with the authorizing provider or a provider within the authorizing providers department within the previous 12 mos or has a future within next 30 days. See \"Patient Info\" tab in inbasket, or \"Choose Columns\" in Meds & Orders section of the refill encounter.              Passed - Medication is active on med list        Passed - No active pregnancy on record        Passed - No positive pregnancy test in past 12 months           Pt last seen 7/14/2021 for annual exam    Last prescribed 7/14/2021 for 84 tablets with 3 refills    Prescription approved per [facility] Refill Protocol.    RN sent luis manuel refill and reminder to schedule annual exam.    Carol Ann Park RN on 5/23/2022 at 12:39 PM        "

## 2022-08-11 ENCOUNTER — MYC REFILL (OUTPATIENT)
Dept: OBGYN | Facility: CLINIC | Age: 42
End: 2022-08-11

## 2022-08-11 DIAGNOSIS — Z30.41 ENCOUNTER FOR SURVEILLANCE OF CONTRACEPTIVE PILLS: ICD-10-CM

## 2022-08-11 RX ORDER — DESOGESTREL AND ETHINYL ESTRADIOL 21-5 (28)
1 KIT ORAL DAILY
Qty: 84 TABLET | Refills: 0 | Status: SHIPPED | OUTPATIENT
Start: 2022-08-11 | End: 2022-10-11

## 2022-08-11 NOTE — TELEPHONE ENCOUNTER
"Requested Prescriptions   Pending Prescriptions Disp Refills     desogestrel-ethinyl estradiol (VIORELE) 0.15-0.02/0.01 MG (21/5) tablet 84 tablet 0     Sig: Take 1 tablet by mouth daily       Contraceptives Protocol Failed - 8/11/2022 12:46 PM        Failed - Recent (12 mo) or future (30 days) visit within the authorizing provider's specialty     Patient has had an office visit with the authorizing provider or a provider within the authorizing providers department within the previous 12 mos or has a future within next 30 days. See \"Patient Info\" tab in inbasket, or \"Choose Columns\" in Meds & Orders section of the refill encounter.              Passed - Patient is not a current smoker if age is 35 or older        Passed - Medication is active on med list        Passed - No active pregnancy on record        Passed - No positive pregnancy test in past 12 months           Pt hasn't been seen in office since 7/14/21.  Pt is scheduled for an appt with Adele in October.      Medication is being filled for 1 time refill only due to:  Patient needs to be seen because it has been more than one year since last visit.    Delores Zhang RN        "

## 2022-08-25 DIAGNOSIS — F41.1 GENERALIZED ANXIETY DISORDER: ICD-10-CM

## 2022-08-28 ENCOUNTER — HEALTH MAINTENANCE LETTER (OUTPATIENT)
Age: 42
End: 2022-08-28

## 2022-08-29 RX ORDER — SERTRALINE HYDROCHLORIDE 25 MG/1
50 TABLET, FILM COATED ORAL DAILY
Qty: 180 TABLET | Refills: 0 | Status: SHIPPED | OUTPATIENT
Start: 2022-08-29 | End: 2022-10-11

## 2022-10-11 ENCOUNTER — OFFICE VISIT (OUTPATIENT)
Dept: OBGYN | Facility: CLINIC | Age: 42
End: 2022-10-11
Payer: OTHER GOVERNMENT

## 2022-10-11 VITALS
HEIGHT: 66 IN | DIASTOLIC BLOOD PRESSURE: 82 MMHG | BODY MASS INDEX: 36.16 KG/M2 | WEIGHT: 225 LBS | SYSTOLIC BLOOD PRESSURE: 133 MMHG | OXYGEN SATURATION: 99 % | HEART RATE: 68 BPM

## 2022-10-11 DIAGNOSIS — Z12.31 BREAST CANCER SCREENING BY MAMMOGRAM: ICD-10-CM

## 2022-10-11 DIAGNOSIS — Z30.41 ENCOUNTER FOR SURVEILLANCE OF CONTRACEPTIVE PILLS: ICD-10-CM

## 2022-10-11 DIAGNOSIS — Z01.419 ENCOUNTER FOR GYNECOLOGICAL EXAMINATION WITHOUT ABNORMAL FINDING: Primary | ICD-10-CM

## 2022-10-11 DIAGNOSIS — Z13.1 SCREENING FOR DIABETES MELLITUS: ICD-10-CM

## 2022-10-11 DIAGNOSIS — Z13.6 CARDIOVASCULAR SCREENING; LDL GOAL LESS THAN 130: ICD-10-CM

## 2022-10-11 DIAGNOSIS — F41.1 GENERALIZED ANXIETY DISORDER: ICD-10-CM

## 2022-10-11 PROCEDURE — G0124 SCREEN C/V THIN LAYER BY MD: HCPCS | Performed by: PATHOLOGY

## 2022-10-11 PROCEDURE — 87624 HPV HI-RISK TYP POOLED RSLT: CPT | Performed by: NURSE PRACTITIONER

## 2022-10-11 PROCEDURE — G0145 SCR C/V CYTO,THINLAYER,RESCR: HCPCS | Performed by: NURSE PRACTITIONER

## 2022-10-11 PROCEDURE — 99396 PREV VISIT EST AGE 40-64: CPT | Performed by: NURSE PRACTITIONER

## 2022-10-11 RX ORDER — DESOGESTREL AND ETHINYL ESTRADIOL 21-5 (28)
1 KIT ORAL DAILY
Qty: 84 TABLET | Refills: 3 | Status: SHIPPED | OUTPATIENT
Start: 2022-10-11 | End: 2023-09-18

## 2022-10-11 RX ORDER — SERTRALINE HYDROCHLORIDE 25 MG/1
50 TABLET, FILM COATED ORAL DAILY
Qty: 180 TABLET | Refills: 3 | Status: SHIPPED | OUTPATIENT
Start: 2022-10-11 | End: 2023-11-30

## 2022-10-11 ASSESSMENT — ANXIETY QUESTIONNAIRES
5. BEING SO RESTLESS THAT IT IS HARD TO SIT STILL: NOT AT ALL
6. BECOMING EASILY ANNOYED OR IRRITABLE: SEVERAL DAYS
7. FEELING AFRAID AS IF SOMETHING AWFUL MIGHT HAPPEN: NOT AT ALL
4. TROUBLE RELAXING: NOT AT ALL
3. WORRYING TOO MUCH ABOUT DIFFERENT THINGS: NOT AT ALL
8. IF YOU CHECKED OFF ANY PROBLEMS, HOW DIFFICULT HAVE THESE MADE IT FOR YOU TO DO YOUR WORK, TAKE CARE OF THINGS AT HOME, OR GET ALONG WITH OTHER PEOPLE?: NOT DIFFICULT AT ALL
GAD7 TOTAL SCORE: 1
1. FEELING NERVOUS, ANXIOUS, OR ON EDGE: NOT AT ALL
IF YOU CHECKED OFF ANY PROBLEMS ON THIS QUESTIONNAIRE, HOW DIFFICULT HAVE THESE PROBLEMS MADE IT FOR YOU TO DO YOUR WORK, TAKE CARE OF THINGS AT HOME, OR GET ALONG WITH OTHER PEOPLE: NOT DIFFICULT AT ALL
7. FEELING AFRAID AS IF SOMETHING AWFUL MIGHT HAPPEN: NOT AT ALL
GAD7 TOTAL SCORE: 1
2. NOT BEING ABLE TO STOP OR CONTROL WORRYING: NOT AT ALL

## 2022-10-11 ASSESSMENT — ENCOUNTER SYMPTOMS
ARTHRALGIAS: 1
DIARRHEA: 0
PALPITATIONS: 0
EYE PAIN: 0
JOINT SWELLING: 0
NAUSEA: 0
CHILLS: 0
ABDOMINAL PAIN: 0
SHORTNESS OF BREATH: 0
HEMATURIA: 0
DYSURIA: 0
CONSTIPATION: 0
FREQUENCY: 1
NERVOUS/ANXIOUS: 0
FEVER: 0
WEAKNESS: 0
COUGH: 0
SORE THROAT: 0
HEMATOCHEZIA: 0
BREAST MASS: 0
HEADACHES: 0
HEARTBURN: 0
PARESTHESIAS: 0
MYALGIAS: 0
DIZZINESS: 0

## 2022-10-11 NOTE — PROGRESS NOTES
SUBJECTIVE:   CC: Evon is an 42 year old who presents for preventive health visit.     Healthy Habits:     Getting at least 3 servings of Calcium per day:  Yes    Bi-annual eye exam:  Yes    Dental care twice a year:  Yes    Sleep apnea or symptoms of sleep apnea:  None    Diet:  Regular (no restrictions)    Frequency of exercise:  None    Taking medications regularly:  Yes    Medication side effects:  Other    PHQ-2 Total Score: 0    Additional concerns today:  No      Today's PHQ-2 Score:   PHQ-2 ( 1999 Pfizer) 10/11/2022   Q1: Little interest or pleasure in doing things 0   Q2: Feeling down, depressed or hopeless 0   PHQ-2 Score 0   PHQ-2 Total Score (12-17 Years)- Positive if 3 or more points; Administer PHQ-A if positive -   Q1: Little interest or pleasure in doing things Not at all   Q2: Feeling down, depressed or hopeless Not at all   PHQ-2 Score 0       Abuse: Current or Past (Physical, Sexual or Emotional) - No  Do you feel safe in your environment? Yes        Social History     Tobacco Use     Smoking status: Never     Smokeless tobacco: Never   Substance Use Topics     Alcohol use: No         Alcohol Use 10/11/2022   Prescreen: >3 drinks/day or >7 drinks/week? Not Applicable   Prescreen: >3 drinks/day or >7 drinks/week? -   No flowsheet data found.    Reviewed orders with patient.  Reviewed health maintenance and updated orders accordingly - Yes  Patient Active Problem List   Diagnosis     Generalized anxiety disorder     CARDIOVASCULAR SCREENING; LDL GOAL LESS THAN 160     Encounter for supervision of other normal pregnancy     Right ankle pain     Encounter for initial prescription of contraceptive pills     History of thyroid disease     Seasonal allergies     Past Surgical History:   Procedure Laterality Date     ENT SURGERY      deviated septum     ENT SURGERY         Social History     Tobacco Use     Smoking status: Never     Smokeless tobacco: Never   Substance Use Topics     Alcohol use: No      Family History   Problem Relation Age of Onset     Arthritis Mother         osteoarthitis     Thyroid Disease Mother      Breast Cancer Mother      Heart Disease Father      Arthritis Father      Skin Cancer Father      Arthritis Maternal Grandmother            Breast Cancer Screening:    Mammogram Screening - Offered annual screening and updated Health Maintenance for mutual plan based on risk factor consideration    Pertinent mammograms are reviewed under the imaging tab.    History of abnormal Pap smear: NO - age 30-65 PAP every 5 years with negative HPV co-testing recommended  PAP / HPV Latest Ref Rng & Units 1/3/2018 9/25/2014 9/23/2011   PAP (Historical) - NIL NIL NIL   HPV16 NEG:Negative Negative - -   HPV18 NEG:Negative Negative - -   HRHPV NEG:Negative Negative - -     Reviewed and updated as needed this visit by clinical staff   Tobacco  Allergies  Meds   Med Hx  Surg Hx  Fam Hx  Soc Hx        Reviewed and updated as needed this visit by Provider                 Past Medical History:   Diagnosis Date     Seasonal allergies      URI (upper respiratory infection) 1/4/2012      Past Surgical History:   Procedure Laterality Date     ENT SURGERY      deviated septum     ENT SURGERY         Review of Systems  CONSTITUTIONAL: NEGATIVE for fever, chills, change in weight  INTEGUMENTARU/SKIN: NEGATIVE for worrisome rashes, moles or lesions  EYES: NEGATIVE for vision changes or irritation  ENT: NEGATIVE for ear, mouth and throat problems  RESP: NEGATIVE for significant cough or SOB  BREAST: NEGATIVE for masses, tenderness or discharge  CV: NEGATIVE for chest pain, palpitations or peripheral edema  GI: NEGATIVE for nausea, abdominal pain, heartburn, or change in bowel habits  : NEGATIVE for unusual urinary or vaginal symptoms. Periods are regular-they tend not to start until late in the placebo week, 1 heavier day and then mostly done.  MUSCULOSKELETAL: NEGATIVE for significant arthralgias or  "myalgia  NEURO: NEGATIVE for weakness, dizziness or paresthesias  PSYCHIATRIC: NEGATIVE for changes in mood or affect. GARY completed. Feels the medication dose is working well.     OBJECTIVE:   /82 (BP Location: Right arm, Patient Position: Sitting, Cuff Size: Adult Regular)   Pulse 68   Ht 1.676 m (5' 6\")   Wt 102.1 kg (225 lb)   LMP 09/13/2022 (Approximate)   SpO2 99%   BMI 36.32 kg/m    Physical Exam  GENERAL: healthy, alert and no distress  NECK: no adenopathy, no asymmetry, masses, or scars and thyroid normal to palpation  RESP: lungs clear to auscultation - no rales, rhonchi or wheezes  BREAST: normal without masses, tenderness or nipple discharge and no palpable axillary masses or adenopathy  CV: regular rate and rhythm, normal S1 S2, no S3 or S4, no murmur, click or rub, no peripheral edema and peripheral pulses strong  ABDOMEN: soft, nontender, no hepatosplenomegaly, no masses and bowel sounds normal   (female): normal female external genitalia, normal urethral meatus, vaginal mucosa pink, moist, well rugated, and normal cervix/adnexa/uterus without masses or discharge  MS: no gross musculoskeletal defects noted, no edema  SKIN: no suspicious lesions or rashes  NEURO: Normal strength and tone, mentation intact and speech normal  PSYCH: mentation appears normal, affect normal/bright    ASSESSMENT/PLAN:   (Z01.419) Encounter for gynecological examination without abnormal finding  (primary encounter diagnosis)  Plan: Pap imaged thin layer screen with HPV -         recommended age 30 - 65 years (select HPV order        below)        (Z30.41) Encounter for surveillance of contraceptive pills  Comment: Will refill  Plan: desogestrel-ethinyl estradiol (VIORELE)         0.15-0.02/0.01 MG (21/5) tablet       (F41.1) Generalized anxiety disorder  Comment: Well managed with 50 mg daily, prefers to continue on 2 of the 25 mg tablets daily in the event she chooses to decrease the dose. GARY reviewed.  Plan: " "sertraline (ZOLOFT) 25 MG tablet        (Z13.1) Screening for diabetes mellitus  Plan: Glucose     (Z13.6) CARDIOVASCULAR SCREENING; LDL GOAL LESS THAN 130  Plan: Lipid panel reflex to direct LDL Fasting        (Z12.31) Breast cancer screening by mammogram  Plan: MA Screening Bilateral         COUNSELING:  Reviewed preventive health counseling, as reflected in patient instructions  Special attention given to:        Regular exercise       Healthy diet/nutrition       Contraception    Estimated body mass index is 36.32 kg/m  as calculated from the following:    Height as of this encounter: 1.676 m (5' 6\").    Weight as of this encounter: 102.1 kg (225 lb).    Weight management plan: Discussed healthy diet and exercise guidelines    She reports that she has never smoked. She has never used smokeless tobacco.      Counseling Resources:  ATP IV Guidelines  Pooled Cohorts Equation Calculator  Breast Cancer Risk Calculator  BRCA-Related Cancer Risk Assessment: FHS-7 Tool  FRAX Risk Assessment  ICSI Preventive Guidelines  Dietary Guidelines for Americans, 2010  USDA's MyPlate  ASA Prophylaxis  Lung CA Screening    JANNA Sibley [facility] [place]  Answers for HPI/ROS submitted by the patient on 10/11/2022  GARY 7 TOTAL SCORE: 1      "

## 2022-10-11 NOTE — PATIENT INSTRUCTIONS
If you have any questions regarding your visit, Please contact your care team.     [facility]: [phone]  To Schedule an Appointment 24/7  Call: [phone]ONE NUM[facility]     Adele Troy- APRN CNP      Rich Chopra-Surgery Scheduler  Pearl-Surgery Scheduler         Monday 7:30 am-5:00 pm    Tuesday 8:00 am-4:00 pm    Wednesday 7:30 am-4:00 pm  [place]    Thursday 8:00 am-11:00 am    Friday 7:30 am-4:00 pm [address] [place] [address] [phone] ask for [facility]  [phone] Fax    Imaging Scheduling all locations  [phone]     [facility] Labor and Delivery  [address] [place] [address]   [place], [place] [id number] [phone]         Urgent Care locations:  [facility]   Monday-Friday  10 am - 8 pm  Saturday and Sunday   9 am - 5 pm     [phone]   If you need a medication refill, please contact your pharmacy. Please allow 3 business days for your refill to be completed.  As always, Thank you for trusting us with your healthcare needs!      see additional instructions from your care team below

## 2022-10-15 LAB
BKR LAB AP GYN ADEQUACY: ABNORMAL
BKR LAB AP GYN INTERPRETATION: ABNORMAL
BKR LAB AP HPV REFLEX: ABNORMAL
BKR LAB AP PREVIOUS ABNORMAL: ABNORMAL
PATH REPORT.COMMENTS IMP SPEC: ABNORMAL
PATH REPORT.COMMENTS IMP SPEC: ABNORMAL
PATH REPORT.RELEVANT HX SPEC: ABNORMAL

## 2022-10-17 ENCOUNTER — MYC MEDICAL ADVICE (OUTPATIENT)
Dept: OBGYN | Facility: CLINIC | Age: 42
End: 2022-10-17

## 2022-10-17 LAB
HUMAN PAPILLOMA VIRUS 16 DNA: NEGATIVE
HUMAN PAPILLOMA VIRUS 18 DNA: NEGATIVE
HUMAN PAPILLOMA VIRUS FINAL DIAGNOSIS: NORMAL
HUMAN PAPILLOMA VIRUS OTHER HR: NEGATIVE

## 2022-10-18 PROBLEM — R87.610 ASCUS OF CERVIX WITH NEGATIVE HIGH RISK HPV: Status: ACTIVE | Noted: 2022-10-11

## 2022-10-18 NOTE — TELEPHONE ENCOUNTER
mychart response sent  ASCUS pap, NEG HPV. Cotest in 3 years  mychart result also sent.     Nadira Ryan RN, BSN

## 2023-01-14 ENCOUNTER — HEALTH MAINTENANCE LETTER (OUTPATIENT)
Age: 43
End: 2023-01-14

## 2023-02-09 ENCOUNTER — LAB (OUTPATIENT)
Dept: LAB | Facility: CLINIC | Age: 43
End: 2023-02-09
Payer: OTHER GOVERNMENT

## 2023-02-09 DIAGNOSIS — Z13.6 CARDIOVASCULAR SCREENING; LDL GOAL LESS THAN 130: ICD-10-CM

## 2023-02-09 DIAGNOSIS — Z13.1 SCREENING FOR DIABETES MELLITUS: ICD-10-CM

## 2023-02-09 LAB
CHOLEST SERPL-MCNC: 212 MG/DL
FASTING STATUS PATIENT QL REPORTED: YES
FASTING STATUS PATIENT QL REPORTED: YES
GLUCOSE BLD-MCNC: 91 MG/DL (ref 70–99)
HDLC SERPL-MCNC: 62 MG/DL
LDLC SERPL CALC-MCNC: 118 MG/DL
NONHDLC SERPL-MCNC: 150 MG/DL
TRIGL SERPL-MCNC: 161 MG/DL

## 2023-02-09 PROCEDURE — 36415 COLL VENOUS BLD VENIPUNCTURE: CPT

## 2023-02-09 PROCEDURE — 82947 ASSAY GLUCOSE BLOOD QUANT: CPT

## 2023-02-09 PROCEDURE — 80061 LIPID PANEL: CPT

## 2023-03-20 ENCOUNTER — ANCILLARY PROCEDURE (OUTPATIENT)
Dept: MAMMOGRAPHY | Facility: CLINIC | Age: 43
End: 2023-03-20
Attending: NURSE PRACTITIONER
Payer: OTHER GOVERNMENT

## 2023-03-20 DIAGNOSIS — Z12.31 BREAST CANCER SCREENING BY MAMMOGRAM: ICD-10-CM

## 2023-03-20 PROCEDURE — 77063 BREAST TOMOSYNTHESIS BI: CPT | Mod: TC | Performed by: RADIOLOGY

## 2023-03-20 PROCEDURE — 77067 SCR MAMMO BI INCL CAD: CPT | Mod: TC | Performed by: RADIOLOGY

## 2023-03-22 ENCOUNTER — MYC MEDICAL ADVICE (OUTPATIENT)
Dept: OBGYN | Facility: CLINIC | Age: 43
End: 2023-03-22
Payer: OTHER GOVERNMENT

## 2023-04-03 ENCOUNTER — ANCILLARY PROCEDURE (OUTPATIENT)
Dept: ULTRASOUND IMAGING | Facility: CLINIC | Age: 43
End: 2023-04-03
Attending: NURSE PRACTITIONER
Payer: OTHER GOVERNMENT

## 2023-04-03 ENCOUNTER — ANCILLARY PROCEDURE (OUTPATIENT)
Dept: MAMMOGRAPHY | Facility: CLINIC | Age: 43
End: 2023-04-03
Attending: NURSE PRACTITIONER
Payer: OTHER GOVERNMENT

## 2023-04-03 DIAGNOSIS — R92.8 ABNORMAL MAMMOGRAM: ICD-10-CM

## 2023-04-03 PROCEDURE — 76642 ULTRASOUND BREAST LIMITED: CPT | Mod: 50 | Performed by: RADIOLOGY

## 2023-04-03 PROCEDURE — 77066 DX MAMMO INCL CAD BI: CPT | Performed by: RADIOLOGY

## 2023-04-03 PROCEDURE — G0279 TOMOSYNTHESIS, MAMMO: HCPCS | Performed by: RADIOLOGY

## 2023-09-05 ENCOUNTER — MYC MEDICAL ADVICE (OUTPATIENT)
Dept: FAMILY MEDICINE | Facility: CLINIC | Age: 43
End: 2023-09-05
Payer: OTHER GOVERNMENT

## 2023-09-18 DIAGNOSIS — Z30.41 ENCOUNTER FOR SURVEILLANCE OF CONTRACEPTIVE PILLS: ICD-10-CM

## 2023-09-18 RX ORDER — DESOGESTREL AND ETHINYL ESTRADIOL AND ETHINYL ESTRADIOL 21-5 (28)
1 KIT ORAL DAILY
Qty: 84 TABLET | Refills: 0 | Status: SHIPPED | OUTPATIENT
Start: 2023-09-18 | End: 2023-12-13

## 2023-09-18 NOTE — TELEPHONE ENCOUNTER
"Requested Prescriptions   Pending Prescriptions Disp Refills    VIORELE 0.15-0.02/0.01 MG (21/5) tablet [Pharmacy Med Name: VIORELE TABLETS 28S] 84 tablet 3     Sig: TAKE 1 TABLET BY MOUTH DAILY       Contraceptives Protocol Passed - 9/18/2023  7:36 AM        Passed - Patient is not a current smoker if age is 35 or older        Passed - Recent (12 mo) or future (30 days) visit within the authorizing provider's specialty     Patient has had an office visit with the authorizing provider or a provider within the authorizing providers department within the previous 12 mos or has a future within next 30 days. See \"Patient Info\" tab in inbasket, or \"Choose Columns\" in Meds & Orders section of the refill encounter.              Passed - Medication is active on med list        Passed - No active pregnancy on record        Passed - No positive pregnancy test in past 12 months           Prescription approved per [facility] Refill Protocol.  Delores Zhang RN    "

## 2023-10-12 ENCOUNTER — ANCILLARY PROCEDURE (OUTPATIENT)
Dept: MAMMOGRAPHY | Facility: CLINIC | Age: 43
End: 2023-10-12
Attending: NURSE PRACTITIONER
Payer: OTHER GOVERNMENT

## 2023-10-12 ENCOUNTER — ANCILLARY PROCEDURE (OUTPATIENT)
Dept: ULTRASOUND IMAGING | Facility: CLINIC | Age: 43
End: 2023-10-12
Attending: NURSE PRACTITIONER
Payer: OTHER GOVERNMENT

## 2023-10-12 DIAGNOSIS — R92.8 BI-RADS CATEGORY 3 MAMMOGRAM RESULT: ICD-10-CM

## 2023-10-12 PROCEDURE — G0279 TOMOSYNTHESIS, MAMMO: HCPCS

## 2023-10-12 PROCEDURE — 77066 DX MAMMO INCL CAD BI: CPT

## 2023-10-12 PROCEDURE — 76642 ULTRASOUND BREAST LIMITED: CPT | Mod: RT

## 2023-11-30 ENCOUNTER — MYC MEDICAL ADVICE (OUTPATIENT)
Dept: OBGYN | Facility: CLINIC | Age: 43
End: 2023-11-30
Payer: OTHER GOVERNMENT

## 2023-11-30 DIAGNOSIS — F41.1 GENERALIZED ANXIETY DISORDER: ICD-10-CM

## 2023-11-30 RX ORDER — SERTRALINE HYDROCHLORIDE 25 MG/1
50 TABLET, FILM COATED ORAL DAILY
Qty: 180 TABLET | Refills: 3 | Status: SHIPPED | OUTPATIENT
Start: 2023-11-30

## 2023-11-30 NOTE — TELEPHONE ENCOUNTER
"Requested Prescriptions   Pending Prescriptions Disp Refills    sertraline (ZOLOFT) 25 MG tablet [Pharmacy Med Name: SERTRALINE 25MG TABLETS] 180 tablet 3     Sig: TAKE 2 TABLETS(50 MG) BY MOUTH DAILY       SSRIs Protocol Failed - 11/30/2023 12:13 PM        Failed - Recent (12 mo) or future (30 days) visit within the authorizing provider's specialty     Patient has had an office visit with the authorizing provider or a provider within the authorizing providers department within the previous 12 mos or has a future within next 30 days. See \"Patient Info\" tab in inbasket, or \"Choose Columns\" in Meds & Orders section of the refill encounter.              Passed - Medication is active on med list        Passed - Patient is age 18 or older        Passed - No active pregnancy on record        Passed - No positive pregnancy test in last 12 months           Pt was last seen by JANNA Darnell CNP, on 10/11/22 for a yearly physical.  No future appts scheduled.    Sent e-SENS message.    Addend: pt is still taking Zoloft 50 mg daily.  Routing to JANNA Darnell CNP, to see if she is willing to continue to prescribe for pt.  If so, pt will need to be seen for a yearly appt.    Delores Zhang RN            "

## 2023-12-03 ENCOUNTER — HEALTH MAINTENANCE LETTER (OUTPATIENT)
Age: 43
End: 2023-12-03

## 2023-12-11 ENCOUNTER — MYC MEDICAL ADVICE (OUTPATIENT)
Dept: OBGYN | Facility: CLINIC | Age: 43
End: 2023-12-11
Payer: OTHER GOVERNMENT

## 2023-12-11 DIAGNOSIS — Z30.41 ENCOUNTER FOR SURVEILLANCE OF CONTRACEPTIVE PILLS: ICD-10-CM

## 2023-12-11 NOTE — TELEPHONE ENCOUNTER
"Requested Prescriptions   Pending Prescriptions Disp Refills    VOLNEA 0.15-0.02/0.01 MG (21/5) tablet [Pharmacy Med Name: VOLNEA TABLETS 28S] 84 tablet 0     Sig: TAKE 1 TABLET BY MOUTH DAILY       Contraceptives Protocol Failed - 12/11/2023  3:12 AM        Failed - Recent (12 mo) or future (30 days) visit within the authorizing provider's specialty     Patient has had an office visit with the authorizing provider or a provider within the authorizing providers department within the previous 12 mos or has a future within next 30 days. See \"Patient Info\" tab in inbasket, or \"Choose Columns\" in Meds & Orders section of the refill encounter.              Passed - Patient is not a current smoker if age is 35 or older        Passed - Medication is active on med list        Passed - No active pregnancy on record        Passed - No positive pregnancy test in past 12 months           Pt has not seen JANNA Darnell CNP, since 10/11/22.  Sending [employer] reminder she is due for an appt.    Delores Zhang RN    "

## 2023-12-13 RX ORDER — DESOGESTREL AND ETHINYL ESTRADIOL 21-5 (28)
1 KIT ORAL DAILY
Qty: 84 TABLET | Refills: 0 | Status: SHIPPED | OUTPATIENT
Start: 2023-12-13 | End: 2024-01-18

## 2023-12-13 NOTE — TELEPHONE ENCOUNTER
Pt is scheduled to be seen on 1/19/24.    Medication is being filled for 1 time refill only due to:  Patient needs to be seen because it has been more than one year since last visit.    Delores Zhang RN

## 2024-01-19 ENCOUNTER — OFFICE VISIT (OUTPATIENT)
Dept: OBGYN | Facility: CLINIC | Age: 44
End: 2024-01-19
Payer: OTHER GOVERNMENT

## 2024-01-19 VITALS
OXYGEN SATURATION: 98 % | BODY MASS INDEX: 35.93 KG/M2 | DIASTOLIC BLOOD PRESSURE: 79 MMHG | HEIGHT: 66 IN | SYSTOLIC BLOOD PRESSURE: 125 MMHG | WEIGHT: 223.6 LBS | HEART RATE: 81 BPM

## 2024-01-19 DIAGNOSIS — Z13.6 CARDIOVASCULAR SCREENING; LDL GOAL LESS THAN 130: ICD-10-CM

## 2024-01-19 DIAGNOSIS — Z30.41 ENCOUNTER FOR SURVEILLANCE OF CONTRACEPTIVE PILLS: ICD-10-CM

## 2024-01-19 DIAGNOSIS — Z13.1 SCREENING FOR DIABETES MELLITUS: ICD-10-CM

## 2024-01-19 DIAGNOSIS — Z01.419 ENCOUNTER FOR GYNECOLOGICAL EXAMINATION WITHOUT ABNORMAL FINDING: Primary | ICD-10-CM

## 2024-01-19 PROCEDURE — 99396 PREV VISIT EST AGE 40-64: CPT | Performed by: NURSE PRACTITIONER

## 2024-01-19 RX ORDER — DESOGESTREL AND ETHINYL ESTRADIOL 21-5 (28)
1 KIT ORAL DAILY
Qty: 84 TABLET | Refills: 3 | Status: SHIPPED | OUTPATIENT
Start: 2024-01-19

## 2024-01-19 ASSESSMENT — ENCOUNTER SYMPTOMS
DIARRHEA: 0
CHILLS: 0
HEMATURIA: 0
NAUSEA: 0
EYE PAIN: 0
FEVER: 0
HEADACHES: 1
SORE THROAT: 0
COUGH: 0
PARESTHESIAS: 0
JOINT SWELLING: 0
SHORTNESS OF BREATH: 0
DIZZINESS: 0
NERVOUS/ANXIOUS: 0
ABDOMINAL PAIN: 0
CONSTIPATION: 0
ARTHRALGIAS: 1
FREQUENCY: 0
MYALGIAS: 0
HEMATOCHEZIA: 0
WEAKNESS: 0
DYSURIA: 0
PALPITATIONS: 0
BREAST MASS: 0
HEARTBURN: 1

## 2024-01-19 NOTE — PATIENT INSTRUCTIONS
If you have any questions regarding your visit, Please contact your care team.     [facility]: [phone]  To Schedule an Appointment 24/7  Call: [phone] HOURS TELEPHONE NUMBER     Adele Troy- APRN CNP      Rich Chopra-Surgery Scheduler  [facility]-Surgery Scheduler         Monday 7:30am-2:00pm    Tuesday 7:30am-4:00pm    Wednesday 7:30am-2:00pm    Thursday 7:30am-11:00am    Friday 7:30am-2:00pm [address] [address]  Phone- [phone]   Fax- [phone]     Imaging Scheduling all locations  [phone]    [facility] Labor and Delivery  [address] [place] [address]   [place] [address] [phone]         Urgent Care locations:  [facility]   Monday-Friday  10 am - 8 pm  Saturday and Sunday   9 am - 5 pm     [phone]   If you need a medication refill, please contact your pharmacy. Please allow 3 business days for your refill to be completed.  As always, Thank you for trusting us with your healthcare needs!      see additional instructions from your care team below

## 2024-01-19 NOTE — PROGRESS NOTES
Preventive Care Visit  [facility]  JANNA Sibley CNP, OB/Gyn  Jan 19, 2024       SUBJECTIVE:   Evon is a 43 year old, presenting for the following:  Physical    Healthy Habits:     Getting at least 3 servings of Calcium per day:  Yes    Bi-annual eye exam:  Yes    Dental care twice a year:  Yes    Sleep apnea or symptoms of sleep apnea:  None    Diet:  Regular (no restrictions)    Frequency of exercise:  None    Taking medications regularly:  Yes    Medication side effects:  None    Additional concerns today:  No      Today's PHQ-2 Score:       1/19/2024     8:42 AM   PHQ-2 ( 1999 Pfizer)   Q1: Little interest or pleasure in doing things 0   Q2: Feeling down, depressed or hopeless 0   PHQ-2 Score 0   Q1: Little interest or pleasure in doing things Not at all   Q2: Feeling down, depressed or hopeless Not at all   PHQ-2 Score 0     Social History     Tobacco Use    Smoking status: Never    Smokeless tobacco: Never   Substance Use Topics    Alcohol use: No         1/19/2024     8:42 AM   Alcohol Use   Prescreen: >3 drinks/day or >7 drinks/week? No     Reviewed orders with patient.  Reviewed health maintenance and updated orders accordingly - Yes  Patient Active Problem List   Diagnosis    Generalized anxiety disorder    CARDIOVASCULAR SCREENING; LDL GOAL LESS THAN 160    Encounter for supervision of other normal pregnancy    Right ankle pain    Encounter for initial prescription of contraceptive pills    History of thyroid disease    Seasonal allergies    ASCUS of cervix with negative high risk HPV     Past Surgical History:   Procedure Laterality Date    ENT SURGERY      deviated septum    ENT SURGERY         Social History     Tobacco Use    Smoking status: Never    Smokeless tobacco: Never   Substance Use Topics    Alcohol use: No     Family History   Problem Relation Age of Onset    Arthritis Mother         osteoarthitis    Thyroid Disease Mother     Breast Cancer Mother      Heart Disease Father     Arthritis Father     Skin Cancer Father     Arthritis Maternal Grandmother            Breast Cancer Screening:    FHS-7:       3/20/2023    11:19 AM 10/12/2023    10:29 AM   Breast CA Risk Assessment (FHS-7)   Did any of your first-degree relatives have breast or ovarian cancer? Yes Yes   Did any of your relatives have bilateral breast cancer? No No   Did any man in your family have breast cancer? No No   Did any woman in your family have breast and ovarian cancer? No No   Did any woman in your family have breast cancer before age 50 y? No No   Do you have 2 or more relatives with breast and/or ovarian cancer? No No   Do you have 2 or more relatives with breast and/or bowel cancer? No No     Mammogram Screening: Recommended annual mammography  Pertinent mammograms are reviewed under the imaging tab.    History of abnormal Pap smear: YES - updated in Problem List and Health Maintenance accordingly      Latest Ref Rng & Units 10/11/2022     9:28 AM 1/3/2018     9:30 AM 1/3/2018     9:28 AM   PAP / HPV   PAP  Atypical squamous cells of undetermined significance (ASC-US)      PAP (Historical)    NIL    HPV 16 DNA Negative Negative  Negative     HPV 18 DNA Negative Negative  Negative     Other HR HPV Negative Negative  Negative       Reviewed and updated as needed this visit by clinical staff   Tobacco  Allergies  Meds   Med Hx  Surg Hx  Fam Hx  Soc Hx        Reviewed and updated as needed this visit by Provider                  Past Medical History:   Diagnosis Date    Seasonal allergies     URI (upper respiratory infection) 1/4/2012      Past Surgical History:   Procedure Laterality Date    ENT SURGERY      deviated septum    ENT SURGERY       Review of Systems   Constitutional:  Negative for chills and fever.   HENT:  Negative for congestion, ear pain, hearing loss and sore throat.    Eyes:  Negative for pain and visual disturbance.   Respiratory:  Negative for cough and shortness of  "breath.    Cardiovascular:  Negative for chest pain and palpitations.   Gastrointestinal:  Negative for abdominal pain, constipation, diarrhea and nausea.   Genitourinary:  Negative for dysuria, frequency, genital sores, hematuria, pelvic pain, urgency, vaginal bleeding and vaginal discharge.   Musculoskeletal:  Positive for arthralgias. Negative for joint swelling and myalgias.   Skin:  Negative for rash.   Neurological:  Positive for headaches. Negative for dizziness and weakness.   Psychiatric/Behavioral:  The patient is not nervous/anxious.      OBJECTIVE:   /79 (BP Location: Right arm, Patient Position: Sitting, Cuff Size: Adult Large)   Pulse 81   Ht 1.676 m (5' 6\")   Wt 101.4 kg (223 lb 9.6 oz)   LMP 12/29/2023 (Exact Date)   SpO2 98%   BMI 36.09 kg/m     Estimated body mass index is 36.09 kg/m  as calculated from the following:    Height as of this encounter: 1.676 m (5' 6\").    Weight as of this encounter: 101.4 kg (223 lb 9.6 oz).  Physical Exam  GENERAL: alert and no distress  RESP: lungs clear to auscultation - no rales, rhonchi or wheezes  CV: regular rate and rhythm, normal S1 S2, no S3 or S4, no murmur, click or rub, no peripheral edema  ABDOMEN: soft, nontender, no hepatosplenomegaly, no masses and bowel sounds normal   (female): normal female external genitalia, normal urethral meatus, vaginal mucosa pink, moist, well rugated, and normal cervix/adnexa/uterus without masses or discharge  MS: no gross musculoskeletal defects noted, no edema  SKIN: no suspicious lesions or rashes  NEURO: Normal strength and tone, mentation intact and speech normal  PSYCH: mentation appears normal, affect normal/bright  LYMPH: no cervical, supraclavicular, axillary, or inguinal adenopathy    ASSESSMENT/PLAN:   Encounter for gynecological examination without abnormal finding  Health Maintenance reviewed.    Encounter for surveillance of contraceptive pills  Refill x 1 year  - desogestrel-ethinyl estradiol " (VOLNEA) 0.15-0.02/0.01 MG (21/5) tablet; Take 1 tablet by mouth daily    Screening for diabetes mellitus  - Glucose; Future    CARDIOVASCULAR SCREENING; LDL GOAL LESS THAN 130  - Lipid panel reflex to direct LDL Fasting; Future    Counseling  Reviewed preventive health counseling, as reflected in patient instructions  Special attention given to:        Regular exercise       Healthy diet/nutrition       Contraception       Colorectal Cancer Screening        She reports that she has never smoked. She has never used smokeless tobacco.          Signed Electronically by: JANNA Sibley CNP  Answers submitted by the patient for this visit:  Annual Preventive Visit (Submitted on 1/19/2024)  Chief Complaint: Annual Exam:  Blood in stool: No  heartburn: Yes  peripheral edema: No  mood changes: No  Skin sensation changes: No  tenderness: No  breast mass: No  breast discharge: No

## 2024-07-29 NOTE — PROGRESS NOTES
ASSESSMENT & PLAN    Evon was seen today for pain.    Diagnoses and all orders for this visit:    Right foot pain  -     XR Foot Right G/E 3 Views; Future  -     MR Foot Right w/o Contrast; Future  -     Ankle/Foot Bracing Supplies Order Walking Boot (Tall medium); Right; Pneumatic; Tall    Repetitive stress injury  -     Ankle/Foot Bracing Supplies Order Walking Boot (Tall medium); Right; Pneumatic; Tall      Undiagnosed new problem, unclear prognosis.  See below.  Questions answered. Discussed signs and symptoms that may indicate more serious issues; the patient was instructed to seek appropriate care if noted. Evon indicates understanding of these issues and agrees with the plan.      See Patient Instructions  Patient Instructions   Reviewed right foot pain.  Pain, tenderness, swelling in the area of the lateral midfoot to forefoot raises question of underlying bony stress injury.  X-rays today do not demonstrate any obvious stress injury, though there are some subtle calcifications in the area.  We discussed potential additional imaging with MRI, planned this.  MRI right foot next, order placed.  Scheduling information below.  In the meantime, discussed support options, including with CAM Walker, versus fracture shoe or Dynaflex plate.  Options reviewed today.  May use as desired, remove when at rest or as desired.    Advanced imaging is done by appointment. Please call [facility] ([facility]/[place]) [phone] , [facility] ([facility]/[place]) [phone] , or [facility] ([facility]) [phone]  to schedule your MRI.     Some insurance companies may require a prior authorization to be completed which can delay the time until you are able to schedule your appointment.       If you are active on [employer], you may have access to your test results before your provider is able to review the study and advise on next steps.   "    The clinic will plan to contact you with results either via phone or Real Sav[employer]. If you have not heard from the clinic within 2-3 days following your MRI, please contact us at [phone] or via [employer].  Alternatively, if interested in further discussion with me about the findings and next steps, feel free to schedule a telephone visit, video visit, or recheck appointment in clinic.        If you have any further questions for your physician or physician s care team you can contact them thru Prem[employer] or by calling [phone].      Georges Corcoran [facility] [place]    -----  Chief Complaint   Patient presents with    Right Foot - Pain       SUBJECTIVE  Evon Varela is a/an 44 year old female who is seen as a self referral for evaluation of right foot.     The patient is seen with their daughter.    Onset: 4 month(s) ago. Reports insidious onset without acute precipitating event. Worse within the last month. Feels like there is \"warmth\" in side the foot.   Location of Pain: right foot, lateral foot and MTPs and midfoot. Notes shooting pain in arch of foot.  Worsened by: Pressure on the top of the foot, end of the day, on the feat for a longer period of time  Better with:   Treatments tried: Supportive shoes, elevation, icing, tylenol/ibuprofen  Associated symptoms: swelling in the right foot    Orthopedic/Surgical history: NO  Social History/Occupation: Restitution     **  Above information per rooming staff.  Additional history:  Pain worsening with time. Unable to walk dog due to pain; pain worsens throughout the day.  At night gets shooting pain in arch.  With walking, gets pain around lateral midfoot into forefoot. Has also noted some swelling in dorsal forefoot, e.g., after long sitting. Can also get shooting plantar arch pain at night.  Past couple days some ankle pain, otherwise primarily in foot.  Swelling lateral foot on right.        REVIEW OF " "SYSTEMS:  Review of Systems    OBJECTIVE:  Ht 1.676 m (5' 6\")   Wt 101.2 kg (223 lb)   BMI 35.99 kg/m         Right Foot exam    ROM: mild limitation throughout ankle motion, with some pain  Midfoot motion with pain    Strength: reduced at ankle with pain     Tender: lateral forefoot, lateral midfoot, dorsal forefoot, dorsal midfoot, lateral ankle    Skin:     neg (-) bruising        positive (+) swelling more lateral midfoot, forefoot       well perfused       capillary refill brisk         RADIOLOGY:  Final results and radiologist's interpretation, available in the [facility] health record.  Images were reviewed with the patient in the office today.  My personal interpretation of the performed imaging: no clear acute bony abnormality noted. Small calcification near 5th MT base appears chronic. Smaller, faint calcifications adjacent to proximal 5th MT.        XR FOOT RIGHT G/E 3 VIEWS 7/30/2024 1:10 PM      HISTORY: Diffuse MTP and lateral foot pain; Right foot pain     COMPARISON: None.                                                                          IMPRESSION: Mild degenerative change first MTP joint. No evidence for  fracture. Right foot otherwise negative.     JULISSA JUSTICE MD              "

## 2024-07-30 ENCOUNTER — ANCILLARY PROCEDURE (OUTPATIENT)
Dept: GENERAL RADIOLOGY | Facility: CLINIC | Age: 44
End: 2024-07-30
Attending: PEDIATRICS
Payer: OTHER GOVERNMENT

## 2024-07-30 ENCOUNTER — OFFICE VISIT (OUTPATIENT)
Dept: ORTHOPEDICS | Facility: CLINIC | Age: 44
End: 2024-07-30
Payer: OTHER GOVERNMENT

## 2024-07-30 VITALS — HEIGHT: 66 IN | WEIGHT: 223 LBS | BODY MASS INDEX: 35.84 KG/M2

## 2024-07-30 DIAGNOSIS — M79.671 RIGHT FOOT PAIN: Primary | ICD-10-CM

## 2024-07-30 DIAGNOSIS — M79.671 RIGHT FOOT PAIN: ICD-10-CM
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
## 2024-07-30 PROCEDURE — 73630 X-RAY EXAM OF FOOT: CPT | Mod: TC | Performed by: RADIOLOGY

## 2024-07-30 PROCEDURE — 99204 OFFICE O/P NEW MOD 45 MIN: CPT | Performed by: PEDIATRICS

## 2024-07-30 NOTE — Clinical Note
"2024      Evon Varela  [address] [facility] [address]      Dear Colleague,    Thank you for referring your patient, Evon Varela, to the [facility] [place]. Please see a copy of my visit note below.    ASSESSMENT & PLAN    Evon was seen today for pain.    Diagnoses and all orders for this visit:    Right foot pain  -     XR Foot Right G/E 3 Views; Future      This issue is {ACUTE/CHRONIC:118704} and {IMPROVING WORSENIN}.      {FOLLOW UP PLANS (Optional):936420}    Georges Corcoran DO  [facility] [place]    -----  Chief Complaint   Patient presents with    Right Foot - Pain       SUBJECTIVE  Evon Varela is a/an 44 year old female who is seen as a self referral for evaluation of right foot.     The patient is seen with their daughter.    Onset: 4 month(s) ago. Reports insidious onset without acute precipitating event. Worse within the last month. Feels like there is \"warmth\" in side the foot.   Location of Pain: right foot, lateral foot and MTPs and midfoot. Notes shooting pain in arch of foot.  Worsened by: Pressure on the top of the foot, end of the day, on the feat for a longer period of time  Better with:   Treatments tried: Supportive shoes, elevation, icing, tylenol/ibuprofen  Associated symptoms: swelling in the right foot    Orthopedic/Surgical history: NO  Social History/Occupation: Restitution     **  Above information per rooming staff.  Additional history:  ***        REVIEW OF SYSTEMS:  Review of Systems    OBJECTIVE:  Ht 1.676 m (5' 6\")   Wt 101.2 kg (223 lb)   BMI 35.99 kg/m         Right Foot exam    ROM: {foot rom:681159}    Strength: {foot strength:568369}     Tender:{:394693}    Non-Tender:{foot nontender:454514}    Inspection: {foot inspection:983013}    Skin:{foot skin:781992}     Lymphatic: " {lymphatics:548841}    Weightbearing:{weightbearin}        RADIOLOGY:  Final results and radiologist's interpretation, available in the [facility] health record.  Images were reviewed with the patient in the office today.  My personal interpretation of the performed imaging: no clear acute bony abnormality noted. Small calcification near 5th MT base appears chronic. Smaller, faint calcifications adjacent to proximal 5th MT.        ***        {[facility]  Documentation (Optional):205708}  { E&M time (Optional):892973}  {Provider  Link to [facility] Help Grid :102836}         Again, thank you for allowing me to participate in the care of your patient.        Sincerely,        Georges Corcoran, DO

## 2024-07-30 NOTE — PATIENT INSTRUCTIONS
Reviewed right foot pain.  Pain, tenderness, swelling in the area of the lateral midfoot to forefoot raises question of underlying bony stress injury.  X-rays today do not demonstrate any obvious stress injury, though there are some subtle calcifications in the area.  We discussed potential additional imaging with MRI, planned this.  MRI right foot next, order placed.  Scheduling information below.  In the meantime, discussed support options, including with CAM Walker, versus fracture shoe or Dynaflex plate.  Options reviewed today.  May use as desired, remove when at rest or as desired.    Advanced imaging is done by appointment. Please call [facility] ([facility]/[place]) [phone] , [facility] ([facility]/[place]) [phone] , or [facility] ([facility]) [phone]  to schedule your MRI.     Some insurance companies may require a prior authorization to be completed which can delay the time until you are able to schedule your appointment.       If you are active on [employer], you may have access to your test results before your provider is able to review the study and advise on next steps.      The clinic will plan to contact you with results either via phone or [employer]. If you have not heard from the clinic within 2-3 days following your MRI, please contact us at [phone] or via [employer].  Alternatively, if interested in further discussion with me about the findings and next steps, feel free to schedule a telephone visit, video visit, or recheck appointment in clinic.        If you have any further questions for your physician or physician s care team you can contact them thru [employer] or by calling [phone].

## 2024-08-15 ENCOUNTER — ANCILLARY PROCEDURE (OUTPATIENT)
Dept: MRI IMAGING | Facility: CLINIC | Age: 44
End: 2024-08-15
Attending: PEDIATRICS
Payer: OTHER GOVERNMENT

## 2024-08-15 DIAGNOSIS — M79.671 RIGHT FOOT PAIN: ICD-10-CM

## 2024-08-15 PROCEDURE — 73718 MRI LOWER EXTREMITY W/O DYE: CPT | Mod: RT | Performed by: RADIOLOGY

## 2024-08-16 LAB — RADIOLOGIST FLAGS: NORMAL

## 2024-08-27 NOTE — PROGRESS NOTES
ASSESSMENT & PLAN    Evon was seen today for follow up.    Diagnoses and all orders for this visit:    Injury of right foot, subsequent encounter  -     XR Foot Bilateral G/E 3 Views; Future    Repetitive stress injury        See Patient Instructions  Patient Instructions   Reviewed the right foot MRI, demonstrating areas of bony stress, appearance of small avulsion base of the fifth metatarsal, and suspected Lisfranc ligament complex injury.  Updated x-rays today do not show significant change in the bony abnormalities noted on previous imaging.  There may be mild widening at the first and second intermetatarsal space as can be seen with Lisfranc injury, though again the MRI did not note full-thickness tearing.  We discussed continuing with current course, with use of the walking boot more regularly, and limiting weightbearing if pain present.  If needing assistance with getting ambulatory aid (such as crutches, cane, walker, knee scooter), contact clinic.  Provided a letter for work, limiting driving.  Follow-up 3 to 4 weeks, sooner if needed.    If you have any further questions for your physician or physician s care team you can contact them thru MyChart or by calling [phone].      [facility] [place]    SUBJECTIVE- Interim History August 27, 2024    Chief Complaint   Patient presents with    Right Foot - Follow Up       Evon Varela is a 44 year old female who is seen in f/u up for Injury of right foot, subsequent encounter. Since last visit on 7/30/24 patient has had continued pain along the lateral foot, base of the 5th. Still gets swelling and warmth sensation. No interim injury, is continuing to utilize the tall walking boot. Does note shooting pain along 4th and 5th metatarsal of the left foot.  - Now ~ 5 months from initial onset    The patient is seen with their .    Orthopedic/Surgical history: NO  Social History/Occupation:  "Restitution     **  Recall: no specific recent injury as cause for symptoms. Perhaps some remote mild injury, but no specific significant right foot injury recalled.      REVIEW OF SYSTEMS:  Review of Systems    OBJECTIVE:  Ht 1.676 m (5' 6\")   Wt 101.2 kg (223 lb)   BMI 35.99 kg/m       Again with some tenderness in the midfoot, forefoot on right  Discomfort with midfoot rotation on right      RADIOLOGY:  Final results and radiologist's interpretation, [facility].  Images were reviewed with the patient in the office today.  My personal interpretation of the performed imaging: updated x-rays today show small avulsion base 5th MT as noted on previous x-ray, not significantly changed. No clear bony stress injury noted on x-ray. There appears to be slight widening at the right first-second intermetatarsal space proximally as can be seen with Lisfranc injury, on WB images when compared to left; however, also may be related to technique.      EXAM: XR FOOT BILATERAL G/E 3 VIEWS  DATE/TIME: 8/28/2024 9:32 AM     INDICATION: Right foot Avulsion fracture of fifth metatarsal base per  MRI and potential for bony stress injury in 4th metatarsal. Concern  for lisfranc joint injury of right foot, bilateral foot XR for  comparison; Injury of right foot, subsequent encounter  COMPARISON: MRI 8/15/2024 and radiographs 7/30/2024                                                                      IMPRESSION: Normal joint spaces and alignment. Small calcific density  adjacent to the fifth metatarsal base which may reflect the previously  described avulsion fracture. Otherwise, no evidence of acute fracture.  Lisfranc interval on the right appears symmetric to the Lisfranc  interval on the left. Tarsometatarsal joints are normally aligned.        LYNSEY HUFFMAN, DO     =========================================    MR right foot without  contrast 8/16/2024 7:55 AM     History: evaluate lateral " foot pain and swelling, question bony stress  injury; also with diffuse forefoot pain, occasional arch pain; Right  foot pain     Techniques: Multiplanar multisequence imaging of the right foot was  obtained without  administration of intravenous contrast.     Comparison: 7/30/2024     Findings:     Bones     Focal edema-like marrow signal intensity at the fifth metatarsal base  with regional soft tissue edema. Retrospectively, there is tiny linear  ossific radiodensity in the region on comparison radiograph, most  consistent with avulsion fracture of the fifth metatarsal base with  associated high-grade sprain/tear of the lateral cord of the plantar  aponeurosis.     Focal edema edema-like marrow signal intensity at the fourth  metatarsal proximal shaft (image 13 series 7) without fracture line.  Finding could represents low-grade stress reaction.     Focal edema like marrow signal intensity at the dorsal aspect of the  intermediate cuneiform with trace opposing edema of the second  metatarsal base, and focal edema like marrow signal intensity at the  dorsal aspect of the cuboid, may be related to contusion. Small focal  edema like marrow signal intensity plantar aspect of the navicular,  likely related to contusion.     Small subchondral edema like marrow signal intensity at third and  fourth metatarsal bases and navicular at the navicular cuneiform  articulation, likely related to degenerative changes.     Joints and periarticular soft tissue     Joint effusion: Physiologic amount of joint fluid are present.     Plantar plates: Intersesamoidal ligament and sesamoidal phalangeal  ligaments of the first metatarsophalangeal joints are intact. Plantar  plates of the second through fifth toe at metatarsophalangeal joints  are grossly intact.     Intermetatarsal spaces: No interdigital neuroma. Small first and  third, trace thickened intermetatarsal bursal fluid.     Ligaments and Tendons     Lisfranc interosseous  ligament: Trace bony edema at the Lisfranc  interosseous ligament (medial cuneiform to second metatarsal base)  attachment of the medial cuneiform with possible low-grade partial  tear (image 31 series 10). Otherwise, intact.     Moderate to high-grade sprain plantar Lisfranc ligament connecting  medial cuneiform to the second metatarsal base.     Moderate sprain second interosseous cuneometatarsal ligament  connecting second metatarsal to the third metatarsal base.     Edema signal between the first metatarsal base and second metatarsal  proximal shaft, plantar area between distal aspects of  intermediate/lateral cuneiforms, lateral cuneiform and cuboid, likely  related to regional additional strain/sprain.     Tendons: The visualized courses of flexor and extensor tendons are  intact.      Muscles     Mild strain of abductor hallucis and flexor hallucis. No muscle  atrophy.     ANCILLARY FINDINGS                                                                         Impression:  1. Avulsion fracture of fifth metatarsal base with associated high  grade sprain/partial tear lateral cord of plantar aponeurosis.  2. Injury of Lisfranc joint.  *  Moderate to high-grade sprain plantar Lisfranc ligament connecting  medial cuneiform to the second metatarsal base.  *  Moderate sprain second interosseous cuneometatarsal ligament  connecting second metatarsal to the third metatarsal base.  *  Trace bony edema at the Lisfranc interosseous ligament (medial  cuneiform to second metatarsal base) attachment of the medial  cuneiform with possible low-grade partial tear. Otherwise, the most of  the interosseous ligament is intact.  3. Possible bone contusion dorsal intermediate cuneiform, cuboid and  likely small area in plantar aspect of navicular.  4. Query subtle focus of forth metatarsal proximal shaft low grade  stress reaction.     [Consider Follow Up: Lisfranc joint injury.]     This report will be copied to the [facility] to ensure a  provider acknowledges the finding. [facility] is available Monday  through Friday 8am-3:30 pm.     MISTI PALMER             =====================    Previous x-rays:      XR FOOT RIGHT G/E 3 VIEWS 7/30/2024 1:10 PM      HISTORY: Diffuse MTP and lateral foot pain; Right foot pain     COMPARISON: None.                                                                          IMPRESSION: Mild degenerative change first MTP joint. No evidence for  fracture. Right foot otherwise negative.     JULISSA JUSTICE MD               30 minutes spent by me on the date of the encounter doing chart review, history and exam, documentation and further activities per the note

## 2024-08-28 ENCOUNTER — ANCILLARY PROCEDURE (OUTPATIENT)
Dept: GENERAL RADIOLOGY | Facility: CLINIC | Age: 44
End: 2024-08-28
Attending: PEDIATRICS
Payer: OTHER GOVERNMENT

## 2024-08-28 ENCOUNTER — OFFICE VISIT (OUTPATIENT)
Dept: ORTHOPEDICS | Facility: CLINIC | Age: 44
End: 2024-08-28
Payer: OTHER GOVERNMENT

## 2024-08-28 VITALS — HEIGHT: 66 IN | BODY MASS INDEX: 35.84 KG/M2 | WEIGHT: 223 LBS

## 2024-08-28 DIAGNOSIS — S99.921D INJURY OF RIGHT FOOT, SUBSEQUENT ENCOUNTER: Primary | ICD-10-CM
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
## 2024-08-28 DIAGNOSIS — S99.921D INJURY OF RIGHT FOOT, SUBSEQUENT ENCOUNTER: ICD-10-CM

## 2024-08-28 PROCEDURE — 73630 X-RAY EXAM OF FOOT: CPT | Mod: TC | Performed by: RADIOLOGY

## 2024-08-28 PROCEDURE — 99214 OFFICE O/P EST MOD 30 MIN: CPT | Performed by: PEDIATRICS

## 2024-08-28 NOTE — PATIENT INSTRUCTIONS
Reviewed the right foot MRI, demonstrating areas of bony stress, appearance of small avulsion base of the fifth metatarsal, and suspected Lisfranc ligament complex injury.  Updated x-rays today do not show significant change in the bony abnormalities noted on previous imaging.  There may be mild widening at the first and second intermetatarsal space as can be seen with Lisfranc injury, though again the MRI did not note full-thickness tearing.  We discussed continuing with current course, with use of the walking boot more regularly, and limiting weightbearing if pain present.  If needing assistance with getting ambulatory aid (such as crutches, cane, walker, knee scooter), contact clinic.  Provided a letter for work, limiting driving.  Follow-up 3 to 4 weeks, sooner if needed.    If you have any further questions for your physician or physician s care team you can contact them thru Ox[employer] or by calling [phone].

## 2024-08-28 NOTE — LETTER
August 28, 2024      Evon Varela  [address] [facility] [address]        To Whom It May Concern:    Evon Varela was seen in our clinic for recheck of an injury. She may return to work with the following:  Advise work from home at this time due to injury and limited mobility.  Anticipate recheck with update on work status in approximately 3-4 weeks.      Sincerely,            Georges Corcoran

## 2024-08-28 NOTE — Clinical Note
"2024      Evon Varela  [address] [facility] [address]      Dear Colleague,    Thank you for referring your patient, Evon Varela, to the [facility] [place]. Please see a copy of my visit note below.    ASSESSMENT & PLAN    There are no diagnoses linked to this encounter.  This issue is {ACUTE/CHRONIC:868262} and {IMPROVING WORSENIN}.    {FOLLOW UP PLANS (Optional):828930}    Georges Corcoran DO  [facility] [place]    SUBJECTIVE- Interim History 2024    No chief complaint on file.      Evon Varela is a 44 year old female who is seen in f/u up for Data Unavailable. Since last visit on 24 patient has had continued pain along the lateral foot, base of the 5th. Still gets swelling and warmth sensation. No interim injury, is continuing to utilize the tall walking boot. Does note shooting pain along 4th and 5th metatarsal of the left foot.  - Now ~ 5 months from initial onset    The patient is seen with their .    Orthopedic/Surgical history: NO  Social History/Occupation: Restitution       REVIEW OF SYSTEMS:  Review of Systems    OBJECTIVE:  There were no vitals taken for this visit.   General: healthy, alert and in no distress  Skin: no suspicious lesions or rash.  CV: distal perfusion intact ***  Resp: normal respiratory effort without conversational dyspnea   Psych: normal mood and affect  Gait: {FSOC GAIT:351390::\"NORMAL\"}  Neuro: Normal light sensory exam of *** extremity ***    RADIOLOGY:  Final results and radiologist's interpretation, available in the [facility] health record.  Images were reviewed with the patient in the office today.  My personal interpretation of the performed imaging: ***    MR right foot without  contrast 2024 7:55 AM     History: evaluate lateral foot pain and swelling, question bony stress  injury; also with diffuse forefoot pain, occasional arch " pain; Right  foot pain     Techniques: Multiplanar multisequence imaging of the right foot was  obtained without  administration of intravenous contrast.     Comparison: 7/30/2024     Findings:     Bones     Focal edema-like marrow signal intensity at the fifth metatarsal base  with regional soft tissue edema. Retrospectively, there is tiny linear  ossific radiodensity in the region on comparison radiograph, most  consistent with avulsion fracture of the fifth metatarsal base with  associated high-grade sprain/tear of the lateral cord of the plantar  aponeurosis.     Focal edema edema-like marrow signal intensity at the fourth  metatarsal proximal shaft (image 13 series 7) without fracture line.  Finding could represents low-grade stress reaction.     Focal edema like marrow signal intensity at the dorsal aspect of the  intermediate cuneiform with trace opposing edema of the second  metatarsal base, and focal edema like marrow signal intensity at the  dorsal aspect of the cuboid, may be related to contusion. Small focal  edema like marrow signal intensity plantar aspect of the navicular,  likely related to contusion.     Small subchondral edema like marrow signal intensity at third and  fourth metatarsal bases and navicular at the navicular cuneiform  articulation, likely related to degenerative changes.     Joints and periarticular soft tissue     Joint effusion: Physiologic amount of joint fluid are present.     Plantar plates: Intersesamoidal ligament and sesamoidal phalangeal  ligaments of the first metatarsophalangeal joints are intact. Plantar  plates of the second through fifth toe at metatarsophalangeal joints  are grossly intact.     Intermetatarsal spaces: No interdigital neuroma. Small first and  third, trace thickened intermetatarsal bursal fluid.     Ligaments and Tendons     Lisfranc interosseous ligament: Trace bony edema at the Lisfranc  interosseous ligament (medial cuneiform to second metatarsal  base)  attachment of the medial cuneiform with possible low-grade partial  tear (image 31 series 10). Otherwise, intact.     Moderate to high-grade sprain plantar Lisfranc ligament connecting  medial cuneiform to the second metatarsal base.     Moderate sprain second interosseous cuneometatarsal ligament  connecting second metatarsal to the third metatarsal base.     Edema signal between the first metatarsal base and second metatarsal  proximal shaft, plantar area between distal aspects of  intermediate/lateral cuneiforms, lateral cuneiform and cuboid, likely  related to regional additional strain/sprain.     Tendons: The visualized courses of flexor and extensor tendons are  intact.      Muscles     Mild strain of abductor hallucis and flexor hallucis. No muscle  atrophy.     ANCILLARY FINDINGS                                                                         Impression:  1. Avulsion fracture of fifth metatarsal base with associated high  grade sprain/partial tear lateral cord of plantar aponeurosis.  2. Injury of Lisfranc joint.  *  Moderate to high-grade sprain plantar Lisfranc ligament connecting  medial cuneiform to the second metatarsal base.  *  Moderate sprain second interosseous cuneometatarsal ligament  connecting second metatarsal to the third metatarsal base.  *  Trace bony edema at the Lisfranc interosseous ligament (medial  cuneiform to second metatarsal base) attachment of the medial  cuneiform with possible low-grade partial tear. Otherwise, the most of  the interosseous ligament is intact.  3. Possible bone contusion dorsal intermediate cuneiform, cuboid and  likely small area in plantar aspect of navicular.  4. Query subtle focus of forth metatarsal proximal shaft low grade  stress reaction.     [Consider Follow Up: Lisfranc joint injury.]     This report will be copied to the [facility] to ensure a  provider acknowledges the finding. [facility] is available Monday  through  Friday 8am-3:30 pm.     MISTI PALMER     {[facility] 2021 Documentation (Optional):561175}  {2021 E&M time (Optional):663595}  {Provider  Link to [facility] Help Grid :159817}         Again, thank you for allowing me to participate in the care of your patient.        Sincerely,        Georges Corcoran, DO

## 2024-08-31 DIAGNOSIS — F41.1 GENERALIZED ANXIETY DISORDER: ICD-10-CM

## 2024-09-03 RX ORDER — SERTRALINE HYDROCHLORIDE 25 MG/1
50 TABLET, FILM COATED ORAL DAILY
Qty: 180 TABLET | Refills: 3 | OUTPATIENT
Start: 2024-09-03

## 2024-09-03 NOTE — TELEPHONE ENCOUNTER
Refill not appropriate.  Rx sent to the requesting pharmacy on 11/30/23 for a 3 month supply with an additional 3 refills.    Delores Zhang RN

## 2024-09-25 ENCOUNTER — OFFICE VISIT (OUTPATIENT)
Dept: ORTHOPEDICS | Facility: CLINIC | Age: 44
End: 2024-09-25
Payer: OTHER GOVERNMENT

## 2024-09-25 ENCOUNTER — ANCILLARY PROCEDURE (OUTPATIENT)
Dept: GENERAL RADIOLOGY | Facility: CLINIC | Age: 44
End: 2024-09-25
Attending: PEDIATRICS
Payer: OTHER GOVERNMENT

## 2024-09-25 VITALS — RESPIRATION RATE: 18 BRPM | HEIGHT: 66 IN | BODY MASS INDEX: 35.84 KG/M2 | WEIGHT: 223 LBS

## 2024-09-25 DIAGNOSIS — S99.921D RIGHT FOOT INJURY, SUBSEQUENT ENCOUNTER: ICD-10-CM

## 2024-09-25 DIAGNOSIS — S99.921D RIGHT FOOT INJURY, SUBSEQUENT ENCOUNTER: Primary | ICD-10-CM

## 2024-09-25 PROCEDURE — 99213 OFFICE O/P EST LOW 20 MIN: CPT | Performed by: PEDIATRICS

## 2024-09-25 PROCEDURE — 73630 X-RAY EXAM OF FOOT: CPT | Mod: TC | Performed by: STUDENT IN AN ORGANIZED HEALTH CARE EDUCATION/TRAINING PROGRAM

## 2024-09-25 ASSESSMENT — PAIN SCALES - GENERAL: PAINLEVEL: MILD PAIN (2)

## 2024-09-25 NOTE — LETTER
9/25/2024      Evon Varela  [address] [facility] [address]      Dear Colleague,    Thank you for referring your patient, Evon Varela, to the [facility] [place]. Please see a copy of my visit note below.    ASSESSMENT & PLAN    Evon was seen today for follow up.    Diagnoses and all orders for this visit:    Right foot injury, subsequent encounter  -     XR Foot Right G/E 3 Views; Future  -     Physical Therapy  Referral; Future      Some symptom improvement in foot with time, boot.  With improvement discussed weaning boot, physical therapy.  Pt agreed.  See below.  Questions answered. Discussed signs and symptoms that may indicate more serious issues; the patient was instructed to seek appropriate care if noted. Evon indicates understanding of these issues and agrees with the plan.      See Patient Instructions  Patient Instructions   X-rays of the right foot today stable, again demonstrating small calcification adjacent to the fifth metatarsal base consistent with avulsion, not significantly changed.  Otherwise, overall x-ray findings also not significantly changed.  With duration of time in the boot, plan to wean the boot.  Start with 30-60 minutes out of the device.  If that goes well, then may start to add 30-60 minutes out of the device each day.  Once out of the device for up to 4-6 hours continuously, then many discontinue entirely if comfortable doing so.  Also plan physical therapy, referral placed. PT may also be able to assist with weaning the boot.  Monitor course with PT 3-4 weeks to begin.  If ongoing issues, considerations include recheck, future repeat MRI (usually ~3+ months from the initial), other support (e.g., ankle brace or other foot support), future consideration injection (MRI demonstrates some degenerative change as well).    If you have any further questions for your physician or physician s care team you can contact them  "[facility]ru [facility] or by calling [phone].        Georges Mayorga [facility] Col[facility] State HospitalDO  [facility] [place]    SUBJECTIVE- Interim History September 25, 2024    Chief Complaint   Patient presents with     Right Foot - Follow Up       Evon Varela is a 44 year old female who is seen in f/u up for Injury of right foot, subsequent encounter. Since last visit on 8/28/24 patient has had continued pain intermittently along the lateral foot, base of the 5th. Still gets swelling and warmth sensation. No interim injury, is continuing to utilize the tall walking boot. Does note shooting pain along 4th and 5th metatarsal of the left foot. She notes that \"overall it is feeling better\". She did state that she has tried a few hours at home out of the boot, overall this felt OK.     - Now ~ 6 months from initial onset     The patient is seen with their .    Orthopedic/Surgical history: NO  Social History/Occupation: Restitution       REVIEW OF SYSTEMS:  Review of Systems    OBJECTIVE:  Resp 18   Ht 1.676 m (5' 6\")   Wt 101.2 kg (223 lb)   BMI 35.99 kg/m         RADIOLOGY:  Final results and radiologist's interpretation, available in the [facility] health record.  Images were reviewed with the patient in the office today.  My personal interpretation of the performed imaging: no significant change compared to last x-ray.        Recent Results (from the past 24 hour(s))   XR Foot Right G/E 3 Views    Narrative    XR FOOT RIGHT G/E 3 VIEWS 9/25/2024 9:04 AM     HISTORY: Right foot injury, subsequent encounter    COMPARISON: 8/28/2024       Impression    IMPRESSION:    Redemonstrated tiny ossific fragment at the base of the fifth  metatarsal, which could represent previously reported avulsion  fracture. No new fracture identified.    RENA KAPADIA MD         SYSTEM ID:  [id number]           Again, thank you for allowing me to participate in the care of your patient.        Sincerely,        Georges" Mukesh Corcoran DO

## 2024-09-25 NOTE — PROGRESS NOTES
ASSESSMENT & PLAN    Evon was seen today for follow up.    Diagnoses and all orders for this visit:    Right foot injury, subsequent encounter  -     XR Foot Right G/E 3 Views; Future  -     Physical Therapy  Referral; Future      Some symptom improvement in foot with time, boot.  With improvement discussed weaning boot, physical therapy.  Pt agreed.  See below.  Questions answered. Discussed signs and symptoms that may indicate more serious issues; the patient was instructed to seek appropriate care if noted. Evon indicates understanding of these issues and agrees with the plan.      See Patient Instructions  Patient Instructions   X-rays of the right foot today stable, again demonstrating small calcification adjacent to the fifth metatarsal base consistent with avulsion, not significantly changed.  Otherwise, overall x-ray findings also not significantly changed.  With duration of time in the boot, plan to wean the boot.  Start with 30-60 minutes out of the device.  If that goes well, then may start to add 30-60 minutes out of the device each day.  Once out of the device for up to 4-6 hours continuously, then many discontinue entirely if comfortable doing so.  Also plan physical therapy, referral placed. PT may also be able to assist with weaning the boot.  Monitor course with PT 3-4 weeks to begin.  If ongoing issues, considerations include recheck, future repeat MRI (usually ~3+ months from the initial), other support (e.g., ankle brace or other foot support), future consideration injection (MRI demonstrates some degenerative change as well).    If you have any further questions for your physician or physician s care team you can contact them thru [employer] or by calling [phone].        Georges Corcoran [facility] [place]    SUBJECTIVE- Interim History September 25, 2024    Chief Complaint   Patient presents with    Right Foot - Follow Up       Evon RUTLEDGE  "Nichole is a 44 year old female who is seen in f/u up for Injury of right foot, subsequent encounter. Since last visit on 8/28/24 patient has had continued pain intermittently along the lateral foot, base of the 5th. Still gets swelling and warmth sensation. No interim injury, is continuing to utilize the tall walking boot. Does note shooting pain along 4th and 5th metatarsal of the left foot. She notes that \"overall it is feeling better\". She did state that she has tried a few hours at home out of the boot, overall this felt OK.     - Now ~ 6 months from initial onset     The patient is seen with their .    Orthopedic/Surgical history: NO  Social History/Occupation: Restitution       REVIEW OF SYSTEMS:  Review of Systems    OBJECTIVE:  Resp 18   Ht 1.676 m (5' 6\")   Wt 101.2 kg (223 lb)   BMI 35.99 kg/m         RADIOLOGY:  Final results and radiologist's interpretation, available in the [facility] health record.  Images were reviewed with the patient in the office today.  My personal interpretation of the performed imaging: no significant change compared to last x-ray.        Recent Results (from the past 24 hour(s))   XR Foot Right G/E 3 Views    Narrative    XR FOOT RIGHT G/E 3 VIEWS 9/25/2024 9:04 AM     HISTORY: Right foot injury, subsequent encounter    COMPARISON: 8/28/2024       Impression    IMPRESSION:    Redemonstrated tiny ossific fragment at the base of the fifth  metatarsal, which could represent previously reported avulsion  fracture. No new fracture identified.    RENA KAPADIA MD         SYSTEM ID:  [id number]           "

## 2024-09-25 NOTE — PATIENT INSTRUCTIONS
X-rays of the right foot today stable, again demonstrating small calcification adjacent to the fifth metatarsal base consistent with avulsion, not significantly changed.  Otherwise, overall x-ray findings also not significantly changed.  With duration of time in the boot, plan to wean the boot.  Start with 30-60 minutes out of the device.  If that goes well, then may start to add 30-60 minutes out of the device each day.  Once out of the device for up to 4-6 hours continuously, then many discontinue entirely if comfortable doing so.  Also plan physical therapy, referral placed. PT may also be able to assist with weaning the boot.  Monitor course with PT 3-4 weeks to begin.  If ongoing issues, considerations include recheck, future repeat MRI (usually ~3+ months from the initial), other support (e.g., ankle brace or other foot support), future consideration injection (MRI demonstrates some degenerative change as well).    If you have any further questions for your physician or physician s care team you can contact them thru [employer] or by calling [phone].

## 2024-10-11 ENCOUNTER — THERAPY VISIT (OUTPATIENT)
Dept: PHYSICAL THERAPY | Facility: CLINIC | Age: 44
End: 2024-10-11
Attending: PEDIATRICS
Payer: OTHER GOVERNMENT

## 2024-10-11 DIAGNOSIS — M79.671 RIGHT FOOT PAIN: Primary | ICD-10-CM

## 2024-10-11 DIAGNOSIS — S99.921D RIGHT FOOT INJURY, SUBSEQUENT ENCOUNTER: ICD-10-CM

## 2024-10-11 PROCEDURE — 97110 THERAPEUTIC EXERCISES: CPT | Mod: GP

## 2024-10-11 PROCEDURE — 97161 PT EVAL LOW COMPLEX 20 MIN: CPT | Mod: GP

## 2024-10-11 ASSESSMENT — ACTIVITIES OF DAILY LIVING (ADL)
GOING_UP_OR_DOWN_10_STAIRS: A LITTLE BIT OF DIFFICULTY
LEFS_RAW_SCORE: 0
RUNNING_ON_EVEN_GROUND: A LITTLE BIT OF DIFFICULTY
LEFS_SCORE(%): 0
SITTING_FOR_1_HOUR: NO DIFFICULTY
YOUR_USUAL_HOBBIES,_RECREATIONAL_OR_SPORTING_ACTIVITIES: MODERATE DIFFICULTY
PLEASE_INDICATE_YOR_PRIMARY_REASON_FOR_REFERRAL_TO_THERAPY:: FOOT AND/OR ANKLE
PUTTING_ON_YOUR_SHOES_OR_SOCKS: NO DIFFICULTY
RUNNING_ON_UNEVEN_GROUND: A LITTLE BIT OF DIFFICULTY
GETTING_INTO_AND_OUT_OF_A_BATH: NO DIFFICULTY
PERFORMING_HEAVY_ACTIVITIES_AROUND_YOUR_HOME: A LITTLE BIT OF DIFFICULTY
WALKING_BETWEEN_ROOMS: NO DIFFICULTY
GETTING_INTO_OR_OUT_OF_A_CAR: NO DIFFICULTY
WALKING_A_MILE: QUITE A BIT OF DIFFICULTY
PERFORMING_LIGHT_ACTIVITIES_AROUND_YOUR_HOME: A LITTLE BIT OF DIFFICULTY
MAKING_SHARP_TURNS_WHILE_RUNNING_FAST: A LITTLE BIT OF DIFFICULTY
ANY_OF_YOUR_USUAL_WORK,_HOUSEWORK_OR_SCHOOL_ACTIVITIES: A LITTLE BIT OF DIFFICULTY
STANDING_FOR_1_HOUR: A LITTLE BIT OF DIFFICULTY
LIFTING_AN_OBJECT,_LIKE_A_BAG_OF_GROCERIES_FROM_THE_FLOOR: NO DIFFICULTY
SQUATTING: A LITTLE BIT OF DIFFICULTY
ROLLING_OVER_IN_BED: NO DIFFICULTY
SHOPPING: A LITTLE BIT OF DIFFICULTY
WALKING_2_BLOCKS: A LITTLE BIT OF DIFFICULTY

## 2024-10-11 NOTE — PROGRESS NOTES
PHYSICAL THERAPY EVALUATION  Type of Visit: Evaluation       Fall Risk Screen:  Fall screen completed by: PT  Have you fallen 2 or more times in the past year?: No  Have you fallen and had an injury in the past year?: No  Is patient a fall risk?: No    Subjective       Presenting condition or subjective complaint: foot injury    Pt presents to PT with R foot pain. Had been in CAM boot from July to late Sept. Didn't like boot because back was getting irritated. Noticed boot helped with pain.     Doesn't have the sharp pain that she used to have, but now is achey and sore.  Notices some swelling, but its not as bad as it was.     Worse: taking dog on a walk, prolonged time on feet,         MRI on 08.15:   1. Avulsion fracture of fifth metatarsal base with associated high  grade sprain/partial tear lateral cord of plantar aponeurosis.  2. Injury of Lisfranc joint.  * Moderate to high-grade sprain plantar Lisfranc ligament connecting  medial cuneiform to the second metatarsal base.  * Moderate sprain second interosseous cuneometatarsal ligament  connecting second metatarsal to the third metatarsal base.  * Trace bony edema at the Lisfranc interosseous ligament (medial  cuneiform to second metatarsal base) attachment of the medial  cuneiform with possible low-grade partial tear. Otherwise, the most of  the interosseous ligament is intact.  3. Possible bone contusion dorsal intermediate cuneiform, cuboid and  likely small area in plantar aspect of navicular.  4. Query subtle focus of forth metatarsal proximal shaft low grade  stress reaction.     Recent xray on 09.25 shows mostly unchanged injuries.     Date of onset: 09/25/24 (order date)    Relevant medical history: Menopause; Migraines or headaches; Pain at night or rest   Dates & types of surgery:      Prior diagnostic imaging/testing results: MRI; X-ray     Prior therapy history for the same diagnosis, illness or injury:        Living Environment  Social support:  With family members   Type of home: House   Stairs to enter the home: Yes 3 Is there a railing: No     Ramp: No   Stairs inside the home: Yes 25 Is there a railing: Yes     Help at home: None  Equipment owned:       Employment: Yes restitution investigator  Hobbies/Interests: reading camping    Patient goals for therapy: walk for a distance    Pain assessment: Pain present  Location: R foot (lateral and top of foot) /Ratin/10     Objective   FOOT/ANKLE EVALUATION  INTEGUMENTARY (edema, incisions):  fullness top of foot, 1+ pitting edema top of foot  POSTURE:  pes cavus noted, mild-moderate  GAIT:   Slightly reduced R pushoff  BALANCE/PROPRIOCEPTION: Single Leg Stance Eyes Open (seconds): 3sec R foot    ROM:  decreased R DF, decreased R inversion/eversion    STRENGTH:  strong but painful eversion 4-/5, inversion 4-/5, PF 4-/5  Unable to perform SL heel raise on RLE due to pain    FLEXIBILITY:   CKC wall DF: RLE 10cm, LLE 12.5cm       PALPATION:  tenderness top of 2-4th mets, tenderness over 5th met head  JOINT MOBILITY:  restricted midfoot INV/EVER, reduced hindfoot mobility    Assessment & Plan   CLINICAL IMPRESSIONS  Medical Diagnosis: R foot pain    Treatment Diagnosis: R foot pain   Impression/Assessment: Patient is a 44 year old female with R foot complaints.  The following significant findings have been identified: Pain, Decreased ROM/flexibility, Decreased joint mobility, Decreased strength, Impaired balance, and Impaired gait. These impairments interfere with their ability to perform self care tasks, work tasks, recreational activities, household chores, driving , household mobility, and community mobility as compared to previous level of function.     Clinical Decision Making (Complexity):  Clinical Presentation: Stable/Uncomplicated  Clinical Presentation Rationale: based on medical and personal factors listed in PT evaluation  Clinical Decision Making (Complexity): Low complexity    PLAN OF  CARE  Treatment Interventions:  Interventions: Gait Training, Manual Therapy, Neuromuscular Re-education, Therapeutic Activity, Therapeutic Exercise    Long Term Goals     PT Goal 1  Goal Identifier: ambulation  Goal Description: pt will report 2 miles of ambulation non-stop with no device or gait deviations and no foot discomfort  Rationale: to maximize safety and independence with performance of ADLs and functional tasks;to maximize safety and independence within the home;to maximize safety and independence within the community  Target Date: 12/20/24      Frequency of Treatment: 1x/EOW  Duration of Treatment: 12 weeks    Recommended Referrals to Other Professionals:   Education Assessment:   Learner/Method: Patient;No Barriers to Learning  Education Comments: educated on diagnosis, prognosis, expectations of therapy, and importance of movement    Risks and benefits of evaluation/treatment have been explained.   Patient/Family/caregiver agrees with Plan of Care.     Evaluation Time:     PT Eval, Low Complexity Minutes (58892): 15     Signing Clinician: JOSUE FORMAN PT             within normal limits

## 2024-10-18 ENCOUNTER — THERAPY VISIT (OUTPATIENT)
Dept: PHYSICAL THERAPY | Facility: CLINIC | Age: 44
End: 2024-10-18
Attending: PEDIATRICS
Payer: OTHER GOVERNMENT

## 2024-10-18 DIAGNOSIS — M79.671 RIGHT FOOT PAIN: ICD-10-CM

## 2024-10-18 DIAGNOSIS — S99.921D RIGHT FOOT INJURY, SUBSEQUENT ENCOUNTER: Primary | ICD-10-CM

## 2024-10-18 PROCEDURE — 97110 THERAPEUTIC EXERCISES: CPT | Mod: GP | Performed by: PHYSICAL THERAPIST

## 2024-10-18 PROCEDURE — 97140 MANUAL THERAPY 1/> REGIONS: CPT | Mod: GP | Performed by: PHYSICAL THERAPIST

## 2024-11-12 ENCOUNTER — ANCILLARY PROCEDURE (OUTPATIENT)
Dept: MAMMOGRAPHY | Facility: CLINIC | Age: 44
End: 2024-11-12
Attending: FAMILY MEDICINE
Payer: OTHER GOVERNMENT

## 2024-11-12 DIAGNOSIS — Z12.31 VISIT FOR SCREENING MAMMOGRAM: ICD-10-CM

## 2024-11-12 PROCEDURE — 77067 SCR MAMMO BI INCL CAD: CPT | Mod: GC

## 2024-11-12 PROCEDURE — 77063 BREAST TOMOSYNTHESIS BI: CPT | Mod: GC

## 2025-01-08 ENCOUNTER — MYC REFILL (OUTPATIENT)
Dept: OBGYN | Facility: CLINIC | Age: 45
End: 2025-01-08
Payer: COMMERCIAL

## 2025-01-08 DIAGNOSIS — F41.1 GENERALIZED ANXIETY DISORDER: ICD-10-CM

## 2025-01-08 NOTE — TELEPHONE ENCOUNTER
Requested Prescriptions   Pending Prescriptions Disp Refills    sertraline (ZOLOFT) 25 MG tablet 180 tablet 3     Sig: Take 2 tablets (50 mg) by mouth daily.       SSRIs Protocol Failed - 1/8/2025  9:06 AM        Failed - GARY-7 score of less than 5 in past 6 months.     Please review last GARY-7 score.           Passed - Medication is active on med list        Passed - Recent (12 mo) or future (90 days) visit within the authorizing provider's specialty     The patient must have completed an in-person or virtual visit within the past 12 months or has a future visit scheduled within the next 90 days with the authorizing provider s specialty.  Urgent care and e-visits do not qualify as an office visit for this protocol.          Passed - Medication indicated for associated diagnosis     Medication is associated with one or more of the following diagnoses:              Anxiety             Bipolar  Depression  Obsessive-compulsive disorder             Panic disorder  Postmenopausal flushing             Premenstrual dysphoric disorder             Social phobia   Adjustment disorder with depressed mood   Mood disorder   Adjustment disorder with anxious mood          Passed - Patient is age 18 or older        Passed - No active pregnancy on record        Passed - No positive pregnancy test in last 12 months           Last seen:   1/19/2024   Last refilled: 11/30/2023  for 180 tabs & 3 refills    [employer] message sent to patient with appointment reminder.     Nichole MONDRAGON RN -  OB/GYN group

## 2025-01-09 RX ORDER — SERTRALINE HYDROCHLORIDE 25 MG/1
50 TABLET, FILM COATED ORAL DAILY
Qty: 180 TABLET | Refills: 0 | Status: SHIPPED | OUTPATIENT
Start: 2025-01-09

## 2025-01-09 NOTE — TELEPHONE ENCOUNTER
Pt is now scheduled to see JANNA Darnell CNP, on 2/3/25.  However, pt still doesn't have a GARY-7 on file in the last 6 months.    Sending pt the GARY-7 questionnaire to fill out first.  Once this is done we will route to Adele for a luis manuel refill if appropriate.    Addend: pt has now filled out the GARY-7.        7/14/2021     8:51 AM 10/11/2022     9:03 AM 1/9/2025     8:19 AM   GARY-7 SCORE   Total Score 4 (minimal anxiety) 1 (minimal anxiety) 0 (minimal anxiety)   Total Score 4 1 0        Patient-reported      Routing refill request to JANNA Darnell CNP, to approve a luis manuel refill if appropriate.    Delores Zhang RN- OB/GYN group

## 2025-02-03 ENCOUNTER — OFFICE VISIT (OUTPATIENT)
Dept: OBGYN | Facility: CLINIC | Age: 45
End: 2025-02-03
Payer: COMMERCIAL

## 2025-02-03 VITALS
HEIGHT: 66 IN | BODY MASS INDEX: 35.36 KG/M2 | OXYGEN SATURATION: 99 % | DIASTOLIC BLOOD PRESSURE: 81 MMHG | SYSTOLIC BLOOD PRESSURE: 120 MMHG | WEIGHT: 220 LBS | HEART RATE: 77 BPM

## 2025-02-03 DIAGNOSIS — Z13.1 SCREENING FOR DIABETES MELLITUS: ICD-10-CM

## 2025-02-03 DIAGNOSIS — F41.1 GENERALIZED ANXIETY DISORDER: ICD-10-CM

## 2025-02-03 DIAGNOSIS — R87.610 ASCUS OF CERVIX WITH NEGATIVE HIGH RISK HPV: ICD-10-CM

## 2025-02-03 DIAGNOSIS — Z13.6 CARDIOVASCULAR SCREENING; LDL GOAL LESS THAN 130: ICD-10-CM

## 2025-02-03 DIAGNOSIS — Z12.11 SCREEN FOR COLON CANCER: ICD-10-CM

## 2025-02-03 DIAGNOSIS — Z30.41 ENCOUNTER FOR SURVEILLANCE OF CONTRACEPTIVE PILLS: ICD-10-CM

## 2025-02-03 DIAGNOSIS — Z01.419 ENCOUNTER FOR ANNUAL ROUTINE GYNECOLOGICAL EXAMINATION: Primary | ICD-10-CM

## 2025-02-03 LAB
CHOLEST SERPL-MCNC: 196 MG/DL
FASTING STATUS PATIENT QL REPORTED: YES
FASTING STATUS PATIENT QL REPORTED: YES
GLUCOSE SERPL-MCNC: 89 MG/DL (ref 70–99)
HDLC SERPL-MCNC: 57 MG/DL
LDLC SERPL CALC-MCNC: 117 MG/DL
NONHDLC SERPL-MCNC: 139 MG/DL
TRIGL SERPL-MCNC: 111 MG/DL

## 2025-02-03 PROCEDURE — 87624 HPV HI-RISK TYP POOLED RSLT: CPT | Performed by: NURSE PRACTITIONER

## 2025-02-03 PROCEDURE — 36415 COLL VENOUS BLD VENIPUNCTURE: CPT | Performed by: NURSE PRACTITIONER

## 2025-02-03 PROCEDURE — 82947 ASSAY GLUCOSE BLOOD QUANT: CPT | Performed by: NURSE PRACTITIONER

## 2025-02-03 PROCEDURE — 99459 PELVIC EXAMINATION: CPT | Performed by: NURSE PRACTITIONER

## 2025-02-03 PROCEDURE — 80061 LIPID PANEL: CPT | Performed by: NURSE PRACTITIONER

## 2025-02-03 PROCEDURE — 99396 PREV VISIT EST AGE 40-64: CPT | Performed by: NURSE PRACTITIONER

## 2025-02-03 PROCEDURE — 88175 CYTOPATH C/V AUTO FLUID REDO: CPT | Performed by: NURSE PRACTITIONER

## 2025-02-03 RX ORDER — DESOGESTREL AND ETHINYL ESTRADIOL 21-5 (28)
1 KIT ORAL DAILY
Qty: 84 TABLET | Refills: 3 | Status: SHIPPED | OUTPATIENT
Start: 2025-02-03

## 2025-02-03 RX ORDER — SERTRALINE HYDROCHLORIDE 25 MG/1
50 TABLET, FILM COATED ORAL DAILY
Qty: 180 TABLET | Refills: 3 | Status: SHIPPED | OUTPATIENT
Start: 2025-02-03

## 2025-02-03 ASSESSMENT — ANXIETY QUESTIONNAIRES
5. BEING SO RESTLESS THAT IT IS HARD TO SIT STILL: NOT AT ALL
GAD7 TOTAL SCORE: 1
3. WORRYING TOO MUCH ABOUT DIFFERENT THINGS: NOT AT ALL
4. TROUBLE RELAXING: NOT AT ALL
2. NOT BEING ABLE TO STOP OR CONTROL WORRYING: NOT AT ALL
8. IF YOU CHECKED OFF ANY PROBLEMS, HOW DIFFICULT HAVE THESE MADE IT FOR YOU TO DO YOUR WORK, TAKE CARE OF THINGS AT HOME, OR GET ALONG WITH OTHER PEOPLE?: NOT DIFFICULT AT ALL
1. FEELING NERVOUS, ANXIOUS, OR ON EDGE: NOT AT ALL
6. BECOMING EASILY ANNOYED OR IRRITABLE: SEVERAL DAYS
7. FEELING AFRAID AS IF SOMETHING AWFUL MIGHT HAPPEN: NOT AT ALL
GAD7 TOTAL SCORE: 1
GAD7 TOTAL SCORE: 1
7. FEELING AFRAID AS IF SOMETHING AWFUL MIGHT HAPPEN: NOT AT ALL
IF YOU CHECKED OFF ANY PROBLEMS ON THIS QUESTIONNAIRE, HOW DIFFICULT HAVE THESE PROBLEMS MADE IT FOR YOU TO DO YOUR WORK, TAKE CARE OF THINGS AT HOME, OR GET ALONG WITH OTHER PEOPLE: NOT DIFFICULT AT ALL

## 2025-02-03 NOTE — PROGRESS NOTES
SUBJECTIVE:   Evon Varela  is a 44 year old, presenting for the following health issues:  Physical  HPI     Pap smear 2022 ASCUS HR HPV neg, recommendation for repeat cotest in 3 years. Questions if better to do today vs at next preventive visit.        2/3/2025     8:34 AM 1/19/2024     8:42 AM   PHQ-2 ( 1999 [employer])   Q1: Little interest or pleasure in doing things 0 0   Q2: Feeling down, depressed or hopeless 0 0   PHQ-2 Score 0  0   Q1: Little interest or pleasure in doing things Not at all Not at all   Q2: Feeling down, depressed or hopeless Not at all Not at all   PHQ-2 Score 0 0       Patient-reported         Reviewed orders with patient.  Reviewed health maintenance and updated orders accordingly - Yes  Patient Active Problem List   Diagnosis    Generalized anxiety disorder    CARDIOVASCULAR SCREENING; LDL GOAL LESS THAN 160    Encounter for supervision of other normal pregnancy    Right ankle pain    Encounter for initial prescription of contraceptive pills    History of thyroid disease    Seasonal allergies    ASCUS of cervix with negative high risk HPV    Right foot injury, subsequent encounter    Right foot pain     Past Surgical History:   Procedure Laterality Date    ENT SURGERY      deviated septum    ENT SURGERY         Social History     Tobacco Use    Smoking status: Never    Smokeless tobacco: Never   Substance Use Topics    Alcohol use: No     Family History   Problem Relation Age of Onset    Arthritis Mother         osteoarthitis    Thyroid Disease Mother     Breast Cancer Mother     Heart Disease Father     Arthritis Father     Skin Cancer Father     Arthritis Maternal Grandmother            Breast Cancer Screening:   Mammogram Screening - Mammogram every 1-2 years updated in Health Maintenance based on mutual decision making  Pertinent mammograms are reviewed under the imaging tab.    History of abnormal Pap smear: YES - reflected in Problem List and Health Maintenance  "accordingly    Past Medical History:   Diagnosis Date    Seasonal allergies     URI (upper respiratory infection) 1/4/2012      Past Surgical History:   Procedure Laterality Date    ENT SURGERY      deviated septum    ENT SURGERY           Review of Systems  Constitutional, neuro, ENT, endocrine, pulmonary, cardiac, gastrointestinal, genitourinary, musculoskeletal, integument and psychiatric systems are negative, except as otherwise noted.    OBJECTIVE:   /81 (BP Location: Right arm, Patient Position: Sitting, Cuff Size: Adult Large)   Pulse 77   Ht 1.676 m (5' 6\")   Wt 99.8 kg (220 lb)   LMP 01/17/2025 (Exact Date)   SpO2 99%   BMI 35.51 kg/m    Physical Exam   GENERAL: alert and no distress  RESP: lungs clear to auscultation - no rales, rhonchi or wheezes  BREAST: normal without masses, tenderness or nipple discharge and no palpable axillary masses or adenopathy  CV: regular rate and rhythm, normal S1 S2, no S3 or S4, no murmur, click or rub, no peripheral edema  ABDOMEN: soft, nontender, no hepatosplenomegaly, no masses and bowel sounds normal   (female) w/bimanual: normal female external genitalia, normal urethral meatus, normal vaginal mucosa, and normal cervix/adnexa/uterus without masses or discharge  MS: no gross musculoskeletal defects noted, no edema  SKIN: no suspicious lesions or rashes  NEURO: Normal strength and tone, mentation intact and speech normal  PSYCH: mentation appears normal, affect normal/bright      ASSESSMENT/PLAN:   Encounter for annual routine gynecological examination  Health Maintenance reviewed.    Encounter for surveillance of contraceptive pills  Refill x 1 year  - desogestrel-ethinyl estradiol (VOLNEA) 0.15-0.02/0.01 MG (21/5) tablet; Take 1 tablet by mouth daily.    Generalized anxiety disorder  Well managed with current regimen - prefers 2 tablets of 50 mg instead of prescription for 1 tablet of 100mg.  - sertraline (ZOLOFT) 25 MG tablet; Take 2 tablets (50 mg) by " mouth daily.    Screen for colon cancer  - Colonoscopy Screening  Referral; Future    Screening for diabetes mellitus  - Glucose; Future    CARDIOVASCULAR SCREENING; LDL GOAL LESS THAN 130  - Lipid panel reflex to direct LDL Fasting; Future    ASCUS of cervix with negative high risk HPV  Discussed repeat screening due later this year-prefers to complete today.  - HPV and Gynecologic Cytology Panel    Counseling  Reviewed preventive health counseling, as reflected in patient instructions  Special attention given to:        Regular exercise       Healthy diet/nutrition       Contraception       Colorectal Cancer Screening       (Joaquina)menopause management      Signed Electronically by: JANNA Sibley CNP  Answers submitted by the patient for this visit:  Patient Health Questionnaire (G7) (Submitted on 2/3/2025)  GARY 7 TOTAL SCORE: 1

## 2025-02-03 NOTE — PATIENT INSTRUCTIONS
If you have any questions regarding your visit, Please contact your care team.     [facility]: [phone]  To Schedule an Appointment 24/7  Call: [phone]     Adele Shannon APRN CNP      [facility] Scheduler  [facility] Scheduler         Monday 7:30am-2:00pm    Tuesday 7:30am-4:00pm    Wednesday 7:30am-2:00pm    Thursday 7:30am-11:00am    Friday 7:30am-2:00pm [place] [address] [place] [address]  Phone- [phone]   Fax- [phone]     Imaging Scheduling all locations  [phone]    [facility] Labor and Delivery  [address] [place] [address]   [place] [address] [phone]         Urgent Care locations:  [facility]   Monday-Friday  10 am - 8 pm  Saturday and Sunday   9 am - 5 pm     [phone]   If you need a medication refill, please contact your pharmacy. Please allow 3 business days for your refill to be completed.  As always, Thank you for trusting us with your healthcare needs!      see additional instructions from your care team below    Patient Education   Preventive Care Advice   This is general advice given by our system to help you stay healthy. However, your care team may have specific advice just for you. Please talk to your care team about your preventive care needs.  Nutrition  Eat 5 or more servings of fruits and vegetables each day.  Try wheat bread, brown rice and whole grain pasta (instead of white bread, rice, and pasta).  Get enough calcium and vitamin D. Check the label on foods and aim for 100% of the RDA (recommended daily allowance).  Lifestyle  Exercise at least 150 minutes each week  (30 minutes a day, 5 days a week).  Do muscle strengthening activities 2 days a week. These help control your weight and prevent disease.  No  smoking.  Wear sunscreen to prevent skin cancer.  Have a dental exam and cleaning every 6 months.  Yearly exams  See your health care team every year to talk about:  Any changes in your health.  Any medicines your care team has prescribed.  Preventive care, family planning, and ways to prevent chronic diseases.  Shots (vaccines)   HPV shots (up to age 26), if you've never had them before.  Hepatitis B shots (up to age 59), if you've never had them before.  COVID-19 shot: Get this shot when it's due.  Flu shot: Get a flu shot every year.  Tetanus shot: Get a tetanus shot every 10 years.  Pneumococcal, hepatitis A, and RSV shots: Ask your care team if you need these based on your risk.  Shingles shot (for age 50 and up)  General health tests  Diabetes screening:  Starting at age 35, Get screened for diabetes at least every 3 years.  If you are younger than age 35, ask your care team if you should be screened for diabetes.  Cholesterol test: At age 39, start having a cholesterol test every 5 years, or more often if advised.  Bone density scan (DEXA): At age 50, ask your care team if you should have this scan for osteoporosis (brittle bones).  Hepatitis C: Get tested at least once in your life.  STIs (sexually transmitted infections)  Before age 24: Ask your care team if you should be screened for STIs.  After age 24: Get screened for STIs if you're at risk. You are at risk for STIs (including HIV) if:  You are sexually active with more than one person.  You don't use condoms every time.  You or a partner was diagnosed with a sexually transmitted infection.  If you are at risk for HIV, ask about PrEP medicine to prevent HIV.  Get tested for HIV at least once in your life, whether you are at risk for HIV or not.  Cancer screening tests  Cervical cancer screening: If you have a cervix, begin getting regular cervical cancer screening tests starting at age 21.  Breast cancer scan (mammogram): If you've ever had breasts,  begin having regular mammograms starting at age 40. This is a scan to check for breast cancer.  Colon cancer screening: It is important to start screening for colon cancer at age 45.  Have a colonoscopy test every 10 years (or more often if you're at risk) Or, ask your provider about stool tests like a FIT test every year or Cologuard test every 3 years.  To learn more about your testing options, visit:   .  For help making a decision, visit:   https://bit.ly/xl70966.  Prostate cancer screening test: If you have a prostate, ask your care team if a prostate cancer screening test (PSA) at age 55 is right for you.  Lung cancer screening: If you are a current or former smoker ages 50 to 80, ask your care team if ongoing lung cancer screenings are right for you.    For informational purposes only. Not to replace the advice of your health care provider. Copyright   2023 [facility]. All rights reserved. Clinically reviewed by the [facility]. Signpost [id number] - REV 01/24.

## 2025-02-04 LAB
HPV HR 12 DNA CVX QL NAA+PROBE: NEGATIVE
HPV16 DNA CVX QL NAA+PROBE: NEGATIVE
HPV18 DNA CVX QL NAA+PROBE: NEGATIVE
HUMAN PAPILLOMA VIRUS FINAL DIAGNOSIS: NORMAL

## 2025-02-06 PROBLEM — R87.610 ASCUS OF CERVIX WITH NEGATIVE HIGH RISK HPV: Status: ACTIVE | Noted: 2022-10-11

## 2025-02-06 LAB
BKR AP ASSOCIATED HPV REPORT: NORMAL
BKR LAB AP GYN ADEQUACY: NORMAL
BKR LAB AP GYN INTERPRETATION: NORMAL
BKR LAB AP LMP: NORMAL
BKR LAB AP PREVIOUS ABNL DX: NORMAL
BKR LAB AP PREVIOUS ABNORMAL: NORMAL
PATH REPORT.COMMENTS IMP SPEC: NORMAL
PATH REPORT.COMMENTS IMP SPEC: NORMAL
PATH REPORT.RELEVANT HX SPEC: NORMAL

## 2025-02-12 DIAGNOSIS — Z30.41 ENCOUNTER FOR SURVEILLANCE OF CONTRACEPTIVE PILLS: ICD-10-CM

## 2025-02-12 RX ORDER — DESOGESTREL AND ETHINYL ESTRADIOL AND ETHINYL ESTRADIOL 21-5 (28)
1 KIT ORAL DAILY
Qty: 84 TABLET | Refills: 3 | OUTPATIENT
Start: 2025-02-12

## 2025-02-12 NOTE — TELEPHONE ENCOUNTER
Duplicate: desogestrel-ethinyl estradiol (VOLNEA) 0.15-0.02/0.01 MG (21/5) tablet  was sent to [facility] in [place] on 2/3/25 for a year supply.    Delores Zhang RN- OB/GYN group

## 2025-02-19 ENCOUNTER — TELEPHONE (OUTPATIENT)
Dept: GASTROENTEROLOGY | Facility: CLINIC | Age: 45
End: 2025-02-19
Payer: COMMERCIAL

## 2025-02-19 NOTE — TELEPHONE ENCOUNTER
"Endoscopy Scheduling Screen    Have you had any respiratory illness or flu-like symptoms in the last 10 days?  No    What is your communication preference for Instructions and/or Bowel Prep?   MyChart    What insurance is in the chart?  Other:  [employer]    Ordering/Referring Provider: TC CAMEJO    (If ordering provider performs procedure, schedule with ordering provider unless otherwise instructed. )    BMI: Estimated body mass index is 35.51 kg/m  as calculated from the following:    Height as of 2/3/25: 1.676 m (5' 6\").    Weight as of 2/3/25: 99.8 kg (220 lb).     Sedation Ordered  moderate sedation.   If patient BMI > 50 do not schedule in [facility].    If patient BMI > 45 do not schedule at [facility].    Are you taking methadone or Suboxone?  NO, No RN review required.    Have you been diagnosed and are being treated for severe PTSD or severe anxiety?  NO, No RN review required.    Are you taking any prescription medications for pain 3 or more times per week?   NO, No RN review required.    Do you have a history of malignant hyperthermia?  No    (Females) Are you currently pregnant?   No     Have you been diagnosed or told you have pulmonary hypertension?   No    Do you have an LVAD?  No    Have you been told you have moderate to severe sleep apnea?  No.    Have you been told you have COPD, asthma, or any other lung disease?  No    Do you  have a history of any heart conditions or any upcoming cardiac exams like an echo, angiogram, stress test, or ablation?  No     Have you ever had or are you waiting for an organ transplant?  No. Continue scheduling, no site restrictions.    Have you had a stroke or transient ischemic attack (TIA aka \"mini stroke\") in the last 2 years?   No.    Have you been diagnosed with or been told you have cirrhosis of the liver?   No.    Are you currently on dialysis?   No    Do you need assistance transferring?   No    BMI: Estimated body mass index is 35.51 kg/m  as calculated from the " "following:    Height as of 2/3/25: 1.676 m (5' 6\").    Weight as of 2/3/25: 99.8 kg (220 lb).     Is patients BMI > 40 and scheduling location [facility]?  No    Do you take an injectable or oral medication for weight loss or diabetes (excluding insulin)?  No    Do you take the medication Naltrexone?  No    Do you take blood thinners?  No       Prep   Are you currently on dialysis or do you have chronic kidney disease?  No    Do you have a diagnosis of diabetes?  No    Do you have a diagnosis of cystic fibrosis (CF)?  No    On a regular basis do you go 3 -5 days between bowel movements?  No    BMI > 40?  No    Preferred Pharmacy:    [id number] [place] [address]  AT [facility] [place] [place] [address] [facility] [address]  Phone: [phone] Fax: [phone]      Final Scheduling Details     Procedure scheduled  Colonoscopy    Surgeon:  BEVERLY     Date of procedure:  4/23     Pre-OP / PAC:   No - Not required for this site.    Location  [facility]- Per order.    Sedation   Moderate Sedation - Per order.      Patient Reminders:   You will receive a call from a Nurse to review instructions and health history.  This assessment must be completed prior to your procedure.  Failure to complete the Nurse assessment may result in the procedure being cancelled.      On the day of your procedure, please designate an adult(s) who can drive you home stay with you for the next 24 hours. The medicines used in the exam will make you sleepy. You will not be able to drive.      You cannot take public transportation, ride share services, or non-medical taxi service without a responsible caregiver.  Medical transport services are allowed with the requirement that a responsible caregiver will receive you at your destination.  We require that drivers and caregivers are confirmed prior to your procedure.  "

## 2025-04-07 ENCOUNTER — TELEPHONE (OUTPATIENT)
Dept: GASTROENTEROLOGY | Facility: CLINIC | Age: 45
End: 2025-04-07
Payer: COMMERCIAL

## 2025-04-07 NOTE — TELEPHONE ENCOUNTER
I called and spoke with patient. She is requesting to reschedule her procedure. Patient declined to be transferred to scheduling at this time, she states that she will call back later this afternoon to reschedule her procedure when she is home from work.    Angela Mike LPN on 4/7/2025 at 11:20 AM

## 2025-04-07 NOTE — TELEPHONE ENCOUNTER
Caller: Evon Varela      Reason for Reschedule/Cancellation (please be detailed, any staff messages or encounters to note?):   Schedule conflict    Did you cancel or rescheduled an EUS procedure? No.    Is screening questionnaire older than 3 months from the reschedule date.   If Yes, please complete screening questionnaire. No    Prior to reschedule please review:  Ordering Provider: NELL  Sedation Determined: CS  Does patient have any ASC Exclusions, please identify?: NO    Notes on Cancelled Procedure:  Procedure: Lower Endoscopy [Colonoscopy]   Date: 4/23/25  Location: [facility]; [address], [place] [address]   Surgeon: BEVERLY    Rescheduled: Yes,   Procedure: Lower Endoscopy [Colonoscopy]    Date: 5/22/25   Location: [facility]; [address], [place] [address]    Surgeon: DEANA   Sedation Level Scheduled  CS ,  Reason for Sedation Level PER ORDER   Instructions updated and sent: Y     Does patient need PAC or Pre -Op Rescheduled? : N

## 2025-05-01 NOTE — TELEPHONE ENCOUNTER
Bowel Prep Review:  Disclaimer: No call was made to the patient.     Standard Miralax bowel prep.    Recommended due to standard bowel prep.   Instructions were sent via [employer].       Angela Mike LPN  Endoscopy Procedure Pre Assessment

## 2025-05-07 ENCOUNTER — TELEPHONE (OUTPATIENT)
Dept: GASTROENTEROLOGY | Facility: CLINIC | Age: 45
End: 2025-05-07
Payer: COMMERCIAL

## 2025-05-07 NOTE — TELEPHONE ENCOUNTER
Rescheduled Colonoscopy  Due to scheduling conflict    Pre visit planning completed.      Procedure details:    Patient scheduled for Colonoscopy on 5/22/25.     Approximate arrival time: 0840. Procedure time 0925.   *Ensure patient is aware that endoscopy team will be calling about 2 days prior to procedure date to confirm arrival time as this may change.     Facility location: [facility]; [address], [place] [address]. Check in location: 2nd Floor at Surgery desk.  *Disclaimer: Drivers are to check in with patient and stay on campus during procedure.     Sedation type: Conscious sedation     Pre op exam needed? No.    Indication for procedure: Screening       Chart review:     Electronic implanted devices? No    Recent diagnosis of diverticulitis within the last 6 weeks? No      Medication review:    Diabetic? No    Anticoagulants? No    Weight loss medication/injectable? No GLP-1 medication per patient's medication list. Nursing to verify with pre-assessment call.    Other medication HOLDING recommendations:  N/A      Prep for procedure:     Bowel prep recommendation: Standard Miralax.   Due to: standard bowel prep    Prep instructions sent via [employer]         Clara Khan RN  Endoscopy Procedure Pre Assessment   [phone] option 3

## 2025-05-07 NOTE — TELEPHONE ENCOUNTER
Pre assessment completed for upcoming procedure.   (Please see previous telephone encounter notes for complete details)    I called and spoke with patient       Procedure details:    Approximate time and facility location reviewed.   Patient is aware that endoscopy team will be calling about 2 days prior to confirm arrival time.    Designated  policy reviewed and that site requests drivers to check in and stay on campus. Instructed to have someone stay 6  hours post procedure.   *Disclaimer - please notify the [facility] RN GI staff with any  issues/concerns.    Medication review:    Medications reviewed. Please see supporting documentation below. Holding recommendations discussed (if applicable).       Prep for procedure:     Procedure prep instructions reviewed.        Any additional information needed:  N/A      Patient verbalized understanding and had no questions or concerns at this time.      Angela Mike LPN  Endoscopy Procedure Pre Assessment   [phone] option 3

## 2025-05-20 ENCOUNTER — TELEPHONE (OUTPATIENT)
Dept: GASTROENTEROLOGY | Facility: CLINIC | Age: 45
End: 2025-05-20
Payer: COMMERCIAL